# Patient Record
Sex: MALE | Race: WHITE | NOT HISPANIC OR LATINO
[De-identification: names, ages, dates, MRNs, and addresses within clinical notes are randomized per-mention and may not be internally consistent; named-entity substitution may affect disease eponyms.]

---

## 2023-02-28 PROBLEM — Z00.00 ENCOUNTER FOR PREVENTIVE HEALTH EXAMINATION: Status: ACTIVE | Noted: 2023-02-28

## 2023-03-09 ENCOUNTER — APPOINTMENT (OUTPATIENT)
Dept: CARDIOLOGY | Facility: CLINIC | Age: 73
End: 2023-03-09

## 2023-03-15 ENCOUNTER — APPOINTMENT (OUTPATIENT)
Dept: CARDIOLOGY | Facility: CLINIC | Age: 73
End: 2023-03-15

## 2023-04-23 ENCOUNTER — EMERGENCY (EMERGENCY)
Facility: HOSPITAL | Age: 73
LOS: 0 days | Discharge: HOME | End: 2023-04-24
Attending: EMERGENCY MEDICINE
Payer: MEDICARE

## 2023-04-23 VITALS
SYSTOLIC BLOOD PRESSURE: 128 MMHG | TEMPERATURE: 97 F | OXYGEN SATURATION: 97 % | HEART RATE: 107 BPM | DIASTOLIC BLOOD PRESSURE: 76 MMHG | RESPIRATION RATE: 19 BRPM | WEIGHT: 201.94 LBS

## 2023-04-23 DIAGNOSIS — K76.89 OTHER SPECIFIED DISEASES OF LIVER: ICD-10-CM

## 2023-04-23 DIAGNOSIS — Z87.19 PERSONAL HISTORY OF OTHER DISEASES OF THE DIGESTIVE SYSTEM: ICD-10-CM

## 2023-04-23 DIAGNOSIS — R11.2 NAUSEA WITH VOMITING, UNSPECIFIED: ICD-10-CM

## 2023-04-23 DIAGNOSIS — R10.9 UNSPECIFIED ABDOMINAL PAIN: ICD-10-CM

## 2023-04-23 LAB
ALBUMIN SERPL ELPH-MCNC: 4.9 G/DL — SIGNIFICANT CHANGE UP (ref 3.5–5.2)
ALP SERPL-CCNC: 130 U/L — HIGH (ref 30–115)
ALT FLD-CCNC: 30 U/L — SIGNIFICANT CHANGE UP (ref 0–41)
ANION GAP SERPL CALC-SCNC: 15 MMOL/L — HIGH (ref 7–14)
AST SERPL-CCNC: 25 U/L — SIGNIFICANT CHANGE UP (ref 0–41)
BASOPHILS # BLD AUTO: 0.1 K/UL — SIGNIFICANT CHANGE UP (ref 0–0.2)
BASOPHILS NFR BLD AUTO: 0.5 % — SIGNIFICANT CHANGE UP (ref 0–1)
BILIRUB SERPL-MCNC: 0.7 MG/DL — SIGNIFICANT CHANGE UP (ref 0.2–1.2)
BUN SERPL-MCNC: 19 MG/DL — SIGNIFICANT CHANGE UP (ref 10–20)
CALCIUM SERPL-MCNC: 10.6 MG/DL — HIGH (ref 8.4–10.5)
CHLORIDE SERPL-SCNC: 102 MMOL/L — SIGNIFICANT CHANGE UP (ref 98–110)
CO2 SERPL-SCNC: 23 MMOL/L — SIGNIFICANT CHANGE UP (ref 17–32)
CREAT SERPL-MCNC: 1.4 MG/DL — SIGNIFICANT CHANGE UP (ref 0.7–1.5)
EGFR: 54 ML/MIN/1.73M2 — LOW
EOSINOPHIL # BLD AUTO: 0 K/UL — SIGNIFICANT CHANGE UP (ref 0–0.7)
EOSINOPHIL NFR BLD AUTO: 0 % — SIGNIFICANT CHANGE UP (ref 0–8)
GLUCOSE SERPL-MCNC: 112 MG/DL — HIGH (ref 70–99)
HCT VFR BLD CALC: 48.3 % — SIGNIFICANT CHANGE UP (ref 42–52)
HGB BLD-MCNC: 17 G/DL — SIGNIFICANT CHANGE UP (ref 14–18)
IMM GRANULOCYTES NFR BLD AUTO: 0.4 % — HIGH (ref 0.1–0.3)
LACTATE SERPL-SCNC: 2.8 MMOL/L — HIGH (ref 0.7–2)
LIDOCAIN IGE QN: 21 U/L — SIGNIFICANT CHANGE UP (ref 7–60)
LYMPHOCYTES # BLD AUTO: 1.53 K/UL — SIGNIFICANT CHANGE UP (ref 1.2–3.4)
LYMPHOCYTES # BLD AUTO: 7.2 % — LOW (ref 20.5–51.1)
MAGNESIUM SERPL-MCNC: 2.2 MG/DL — SIGNIFICANT CHANGE UP (ref 1.8–2.4)
MCHC RBC-ENTMCNC: 29.4 PG — SIGNIFICANT CHANGE UP (ref 27–31)
MCHC RBC-ENTMCNC: 35.2 G/DL — SIGNIFICANT CHANGE UP (ref 32–37)
MCV RBC AUTO: 83.6 FL — SIGNIFICANT CHANGE UP (ref 80–94)
MONOCYTES # BLD AUTO: 1.09 K/UL — HIGH (ref 0.1–0.6)
MONOCYTES NFR BLD AUTO: 5.1 % — SIGNIFICANT CHANGE UP (ref 1.7–9.3)
NEUTROPHILS # BLD AUTO: 18.44 K/UL — HIGH (ref 1.4–6.5)
NEUTROPHILS NFR BLD AUTO: 86.8 % — HIGH (ref 42.2–75.2)
NRBC # BLD: 0 /100 WBCS — SIGNIFICANT CHANGE UP (ref 0–0)
PLATELET # BLD AUTO: 254 K/UL — SIGNIFICANT CHANGE UP (ref 130–400)
PMV BLD: 11.4 FL — HIGH (ref 7.4–10.4)
POTASSIUM SERPL-MCNC: 4.3 MMOL/L — SIGNIFICANT CHANGE UP (ref 3.5–5)
POTASSIUM SERPL-SCNC: 4.3 MMOL/L — SIGNIFICANT CHANGE UP (ref 3.5–5)
PROT SERPL-MCNC: 7.7 G/DL — SIGNIFICANT CHANGE UP (ref 6–8)
RBC # BLD: 5.78 M/UL — SIGNIFICANT CHANGE UP (ref 4.7–6.1)
RBC # FLD: 12.9 % — SIGNIFICANT CHANGE UP (ref 11.5–14.5)
SODIUM SERPL-SCNC: 140 MMOL/L — SIGNIFICANT CHANGE UP (ref 135–146)
TROPONIN T SERPL-MCNC: <0.01 NG/ML — SIGNIFICANT CHANGE UP
WBC # BLD: 21.24 K/UL — HIGH (ref 4.8–10.8)
WBC # FLD AUTO: 21.24 K/UL — HIGH (ref 4.8–10.8)

## 2023-04-23 PROCEDURE — 85025 COMPLETE CBC W/AUTO DIFF WBC: CPT

## 2023-04-23 PROCEDURE — 84484 ASSAY OF TROPONIN QUANT: CPT

## 2023-04-23 PROCEDURE — 83735 ASSAY OF MAGNESIUM: CPT

## 2023-04-23 PROCEDURE — 83605 ASSAY OF LACTIC ACID: CPT

## 2023-04-23 PROCEDURE — 36415 COLL VENOUS BLD VENIPUNCTURE: CPT

## 2023-04-23 PROCEDURE — 99285 EMERGENCY DEPT VISIT HI MDM: CPT | Mod: FS

## 2023-04-23 PROCEDURE — 74177 CT ABD & PELVIS W/CONTRAST: CPT | Mod: MA

## 2023-04-23 PROCEDURE — 96375 TX/PRO/DX INJ NEW DRUG ADDON: CPT

## 2023-04-23 PROCEDURE — 93005 ELECTROCARDIOGRAM TRACING: CPT

## 2023-04-23 PROCEDURE — 96374 THER/PROPH/DIAG INJ IV PUSH: CPT | Mod: XU

## 2023-04-23 PROCEDURE — 93010 ELECTROCARDIOGRAM REPORT: CPT

## 2023-04-23 PROCEDURE — 99285 EMERGENCY DEPT VISIT HI MDM: CPT | Mod: 25

## 2023-04-23 PROCEDURE — 80053 COMPREHEN METABOLIC PANEL: CPT

## 2023-04-23 PROCEDURE — 83690 ASSAY OF LIPASE: CPT

## 2023-04-23 PROCEDURE — 74177 CT ABD & PELVIS W/CONTRAST: CPT | Mod: 26,MA

## 2023-04-23 RX ORDER — MORPHINE SULFATE 50 MG/1
4 CAPSULE, EXTENDED RELEASE ORAL ONCE
Refills: 0 | Status: DISCONTINUED | OUTPATIENT
Start: 2023-04-23 | End: 2023-04-23

## 2023-04-23 RX ORDER — ONDANSETRON 8 MG/1
4 TABLET, FILM COATED ORAL ONCE
Refills: 0 | Status: COMPLETED | OUTPATIENT
Start: 2023-04-23 | End: 2023-04-23

## 2023-04-23 RX ORDER — SODIUM CHLORIDE 9 MG/ML
1000 INJECTION, SOLUTION INTRAVENOUS ONCE
Refills: 0 | Status: COMPLETED | OUTPATIENT
Start: 2023-04-23 | End: 2023-04-23

## 2023-04-23 RX ADMIN — MORPHINE SULFATE 4 MILLIGRAM(S): 50 CAPSULE, EXTENDED RELEASE ORAL at 19:05

## 2023-04-23 RX ADMIN — ONDANSETRON 4 MILLIGRAM(S): 8 TABLET, FILM COATED ORAL at 19:06

## 2023-04-23 RX ADMIN — SODIUM CHLORIDE 1000 MILLILITER(S): 9 INJECTION, SOLUTION INTRAVENOUS at 19:09

## 2023-04-23 NOTE — ED ADULT NURSE NOTE - NS ED NURSE RECORD ANOTHER VITAL SIGN
Yes Post-Care Instructions: I reviewed with the patient in detail post-care instructions. Patient is to keep the biopsy site clean with soapy water, and then apply vaseline once daily until healed. Patient may apply hydrogen peroxide soaks to remove any crusting.

## 2023-04-24 VITALS
RESPIRATION RATE: 18 BRPM | HEART RATE: 99 BPM | DIASTOLIC BLOOD PRESSURE: 76 MMHG | OXYGEN SATURATION: 98 % | SYSTOLIC BLOOD PRESSURE: 120 MMHG

## 2023-04-24 NOTE — ED PROVIDER NOTE - CLINICAL SUMMARY MEDICAL DECISION MAKING FREE TEXT BOX
73-year-old male, history of duodenitis, hiatal hernia, here with abdominal pain and vomiting.  Last BM today.  Passing gas.  Labs noted for WBC 21, BUN 19, creatinine 1.4, lipase 21, troponin less than 0.01.  EKG no acute changes.  CT abdomen most consistent with ileus.  Given IV fluids, morphine and Zofran with complete relief.  Patient wants to go home.  Will DC to follow-up with PCP.  Instructed to return if symptoms worsen or recur.

## 2023-04-24 NOTE — ED PROVIDER NOTE - ATTENDING APP SHARED VISIT CONTRIBUTION OF CARE
73-year-old male, history of duodenitis, hiatal hernia, presents with abdominal pain and vomiting since this afternoon.  Last BM today.  Passing gas.  Exam shows alert patient in no distress, HEENT NCAT PERRL, lungs clear, RR S1S2, abdomen soft +periumbilical tenderness +BS, no CCE.

## 2023-04-24 NOTE — ED PROVIDER NOTE - PATIENT PORTAL LINK FT
You can access the FollowMyHealth Patient Portal offered by [facility] by registering at the following website: [email or web address]. By joining [employer]’s FollowMyHealth portal, you will also be able to view your health information using other applications (apps) compatible with our system.

## 2023-04-24 NOTE — ED PROVIDER NOTE - OBJECTIVE STATEMENT
patient c/o N/V since this afternoon, Woke this am ate drank coffee felt fine, Normal BM today, no blood, not black and tarry,   vomiting bile past several hours with abd pain, no chest pain, no SOB,

## 2023-05-04 PROBLEM — K29.80 DUODENITIS WITHOUT BLEEDING: Chronic | Status: ACTIVE | Noted: 2023-04-24

## 2023-05-04 PROBLEM — K44.9 DIAPHRAGMATIC HERNIA WITHOUT OBSTRUCTION OR GANGRENE: Chronic | Status: ACTIVE | Noted: 2023-04-24

## 2023-05-11 ENCOUNTER — OUTPATIENT (OUTPATIENT)
Dept: OUTPATIENT SERVICES | Facility: HOSPITAL | Age: 73
LOS: 1 days | End: 2023-05-11
Payer: MEDICARE

## 2023-05-11 DIAGNOSIS — R10.9 UNSPECIFIED ABDOMINAL PAIN: ICD-10-CM

## 2023-05-11 DIAGNOSIS — Z00.8 ENCOUNTER FOR OTHER GENERAL EXAMINATION: ICD-10-CM

## 2023-05-11 PROCEDURE — A9579: CPT

## 2023-05-11 PROCEDURE — 74183 MRI ABD W/O CNTR FLWD CNTR: CPT

## 2023-05-11 PROCEDURE — 74183 MRI ABD W/O CNTR FLWD CNTR: CPT | Mod: 26,MH

## 2023-05-12 DIAGNOSIS — R10.9 UNSPECIFIED ABDOMINAL PAIN: ICD-10-CM

## 2023-06-01 ENCOUNTER — APPOINTMENT (OUTPATIENT)
Dept: CARDIOLOGY | Facility: CLINIC | Age: 73
End: 2023-06-01
Payer: MEDICARE

## 2023-06-01 VITALS — DIASTOLIC BLOOD PRESSURE: 72 MMHG | SYSTOLIC BLOOD PRESSURE: 124 MMHG

## 2023-06-01 VITALS — HEIGHT: 70 IN | BODY MASS INDEX: 28.35 KG/M2 | TEMPERATURE: 97.3 F | WEIGHT: 198 LBS

## 2023-06-01 VITALS — HEART RATE: 89 BPM | SYSTOLIC BLOOD PRESSURE: 136 MMHG | DIASTOLIC BLOOD PRESSURE: 86 MMHG

## 2023-06-01 DIAGNOSIS — Z83.3 FAMILY HISTORY OF DIABETES MELLITUS: ICD-10-CM

## 2023-06-01 DIAGNOSIS — Z78.9 OTHER SPECIFIED HEALTH STATUS: ICD-10-CM

## 2023-06-01 DIAGNOSIS — R53.83 OTHER FATIGUE: ICD-10-CM

## 2023-06-01 DIAGNOSIS — M54.16 RADICULOPATHY, LUMBAR REGION: ICD-10-CM

## 2023-06-01 DIAGNOSIS — Z82.49 FAMILY HISTORY OF ISCHEMIC HEART DISEASE AND OTHER DISEASES OF THE CIRCULATORY SYSTEM: ICD-10-CM

## 2023-06-01 DIAGNOSIS — E78.5 HYPERLIPIDEMIA, UNSPECIFIED: ICD-10-CM

## 2023-06-01 DIAGNOSIS — Z86.59 PERSONAL HISTORY OF OTHER MENTAL AND BEHAVIORAL DISORDERS: ICD-10-CM

## 2023-06-01 DIAGNOSIS — R06.02 SHORTNESS OF BREATH: ICD-10-CM

## 2023-06-01 DIAGNOSIS — I10 ESSENTIAL (PRIMARY) HYPERTENSION: ICD-10-CM

## 2023-06-01 PROCEDURE — 99204 OFFICE O/P NEW MOD 45 MIN: CPT

## 2023-06-01 PROCEDURE — 93000 ELECTROCARDIOGRAM COMPLETE: CPT

## 2023-06-01 RX ORDER — ROSUVASTATIN CALCIUM 10 MG/1
10 TABLET, FILM COATED ORAL
Refills: 0 | Status: ACTIVE | COMMUNITY

## 2023-06-01 RX ORDER — BUPROPION HYDROCHLORIDE 75 MG/1
TABLET, FILM COATED ORAL
Refills: 0 | Status: ACTIVE | COMMUNITY

## 2023-06-01 RX ORDER — ESCITALOPRAM OXALATE 10 MG/1
10 TABLET ORAL
Refills: 0 | Status: ACTIVE | COMMUNITY

## 2023-06-01 NOTE — ASSESSMENT
[FreeTextEntry1] : COLÓN/SOB: Pt with HLD, cannot rule out ischemic cause of symptoms. \par -Check exercise stress echo. \par -Discussed with patient if worsening symptoms or new CP, to go to ED for evaluation. \par \par HLD: At goal\par -Continue with rosuvastatin 10mg PO daily. \par -Discussed therapeutic lifestyle changes to promote improved lipid metabolism. \par \par Elevated AP:\par -Being monitored by PMD. \par \par Follow up in 3 months. \par

## 2023-06-01 NOTE — REASON FOR VISIT
[FreeTextEntry1] : Diagnostic Tests:\par ---------------------\par EKG: \par 06/01/23-NSR with PACs. [facility].

## 2023-06-01 NOTE — HISTORY OF PRESENT ILLNESS
[FreeTextEntry1] : Mr. Oropeza is a 74yo M with PMHx of HTN, HLD who presents to \[facility]\"" care. His PMD is Dr. Josephine Adams. He has been having increased fatigue with some SOB. He complains of COLÓN with stairs. These symptoms started after having COVID last year. He denies associated CP, palpitations, lightheadedness. His wife recently passed away after possible MI.

## 2023-08-23 ENCOUNTER — APPOINTMENT (OUTPATIENT)
Dept: CARDIOLOGY | Facility: CLINIC | Age: 73
End: 2023-08-23

## 2023-10-04 ENCOUNTER — APPOINTMENT (OUTPATIENT)
Dept: ORTHOPEDIC SURGERY | Facility: CLINIC | Age: 73
End: 2023-10-04
Payer: MEDICARE

## 2023-10-04 VITALS — WEIGHT: 198 LBS | BODY MASS INDEX: 28.35 KG/M2 | HEIGHT: 70 IN

## 2023-10-04 DIAGNOSIS — M76.62 ACHILLES TENDINITIS, LEFT LEG: ICD-10-CM

## 2023-10-04 PROCEDURE — 99203 OFFICE O/P NEW LOW 30 MIN: CPT

## 2023-10-04 PROCEDURE — 73590 X-RAY EXAM OF LOWER LEG: CPT | Mod: LT

## 2023-10-04 PROCEDURE — 73610 X-RAY EXAM OF ANKLE: CPT | Mod: LT

## 2023-10-05 RX ORDER — MELOXICAM 15 MG/1
15 TABLET ORAL
Qty: 30 | Refills: 0 | Status: ACTIVE | COMMUNITY
Start: 2023-10-05 | End: 1900-01-01

## 2023-10-19 ENCOUNTER — APPOINTMENT (OUTPATIENT)
Dept: CARDIOLOGY | Facility: CLINIC | Age: 73
End: 2023-10-19

## 2023-11-01 ENCOUNTER — APPOINTMENT (OUTPATIENT)
Dept: CARDIOLOGY | Facility: CLINIC | Age: 73
End: 2023-11-01

## 2024-01-17 ENCOUNTER — EMERGENCY (EMERGENCY)
Facility: HOSPITAL | Age: 74
LOS: 0 days | Discharge: HOME | End: 2024-01-17
Attending: EMERGENCY MEDICINE
Payer: MEDICARE

## 2024-01-17 ENCOUNTER — INPATIENT (INPATIENT)
Facility: HOSPITAL | Age: 74
LOS: 2 days | Discharge: HOME | DRG: 418 | End: 2024-01-20
Attending: SURGERY | Admitting: SURGERY
Payer: MEDICARE

## 2024-01-17 VITALS
TEMPERATURE: 98 F | DIASTOLIC BLOOD PRESSURE: 81 MMHG | WEIGHT: 199.96 LBS | RESPIRATION RATE: 18 BRPM | HEIGHT: 69 IN | SYSTOLIC BLOOD PRESSURE: 157 MMHG | HEART RATE: 88 BPM | OXYGEN SATURATION: 99 %

## 2024-01-17 VITALS
HEART RATE: 74 BPM | SYSTOLIC BLOOD PRESSURE: 151 MMHG | RESPIRATION RATE: 18 BRPM | DIASTOLIC BLOOD PRESSURE: 83 MMHG | OXYGEN SATURATION: 97 %

## 2024-01-17 VITALS
HEART RATE: 94 BPM | WEIGHT: 199.96 LBS | TEMPERATURE: 97 F | SYSTOLIC BLOOD PRESSURE: 155 MMHG | HEIGHT: 69 IN | DIASTOLIC BLOOD PRESSURE: 86 MMHG | OXYGEN SATURATION: 99 % | RESPIRATION RATE: 18 BRPM

## 2024-01-17 DIAGNOSIS — E78.5 HYPERLIPIDEMIA, UNSPECIFIED: ICD-10-CM

## 2024-01-17 DIAGNOSIS — K44.9 DIAPHRAGMATIC HERNIA WITHOUT OBSTRUCTION OR GANGRENE: ICD-10-CM

## 2024-01-17 DIAGNOSIS — R10.13 EPIGASTRIC PAIN: ICD-10-CM

## 2024-01-17 DIAGNOSIS — R11.0 NAUSEA: ICD-10-CM

## 2024-01-17 LAB
ALBUMIN SERPL ELPH-MCNC: 4.2 G/DL — SIGNIFICANT CHANGE UP (ref 3.5–5.2)
ALBUMIN SERPL ELPH-MCNC: 4.5 G/DL — SIGNIFICANT CHANGE UP (ref 3.5–5.2)
ALP SERPL-CCNC: 119 U/L — HIGH (ref 30–115)
ALP SERPL-CCNC: 124 U/L — HIGH (ref 30–115)
ALT FLD-CCNC: 20 U/L — SIGNIFICANT CHANGE UP (ref 0–41)
ALT FLD-CCNC: 22 U/L — SIGNIFICANT CHANGE UP (ref 0–41)
ANION GAP SERPL CALC-SCNC: 10 MMOL/L — SIGNIFICANT CHANGE UP (ref 7–14)
ANION GAP SERPL CALC-SCNC: 13 MMOL/L — SIGNIFICANT CHANGE UP (ref 7–14)
APPEARANCE UR: CLEAR — SIGNIFICANT CHANGE UP
AST SERPL-CCNC: 17 U/L — SIGNIFICANT CHANGE UP (ref 0–41)
AST SERPL-CCNC: 19 U/L — SIGNIFICANT CHANGE UP (ref 0–41)
BASOPHILS # BLD AUTO: 0.05 K/UL — SIGNIFICANT CHANGE UP (ref 0–0.2)
BASOPHILS # BLD AUTO: 0.08 K/UL — SIGNIFICANT CHANGE UP (ref 0–0.2)
BASOPHILS NFR BLD AUTO: 0.3 % — SIGNIFICANT CHANGE UP (ref 0–1)
BASOPHILS NFR BLD AUTO: 0.5 % — SIGNIFICANT CHANGE UP (ref 0–1)
BILIRUB SERPL-MCNC: 0.3 MG/DL — SIGNIFICANT CHANGE UP (ref 0.2–1.2)
BILIRUB SERPL-MCNC: 0.7 MG/DL — SIGNIFICANT CHANGE UP (ref 0.2–1.2)
BILIRUB UR-MCNC: NEGATIVE — SIGNIFICANT CHANGE UP
BUN SERPL-MCNC: 14 MG/DL — SIGNIFICANT CHANGE UP (ref 10–20)
BUN SERPL-MCNC: 16 MG/DL — SIGNIFICANT CHANGE UP (ref 10–20)
CALCIUM SERPL-MCNC: 9.4 MG/DL — SIGNIFICANT CHANGE UP (ref 8.4–10.5)
CALCIUM SERPL-MCNC: 9.7 MG/DL — SIGNIFICANT CHANGE UP (ref 8.4–10.5)
CHLORIDE SERPL-SCNC: 100 MMOL/L — SIGNIFICANT CHANGE UP (ref 98–110)
CHLORIDE SERPL-SCNC: 103 MMOL/L — SIGNIFICANT CHANGE UP (ref 98–110)
CO2 SERPL-SCNC: 24 MMOL/L — SIGNIFICANT CHANGE UP (ref 17–32)
CO2 SERPL-SCNC: 27 MMOL/L — SIGNIFICANT CHANGE UP (ref 17–32)
COLOR SPEC: YELLOW — SIGNIFICANT CHANGE UP
CREAT SERPL-MCNC: 1.1 MG/DL — SIGNIFICANT CHANGE UP (ref 0.7–1.5)
CREAT SERPL-MCNC: 1.4 MG/DL — SIGNIFICANT CHANGE UP (ref 0.7–1.5)
DIFF PNL FLD: NEGATIVE — SIGNIFICANT CHANGE UP
EGFR: 54 ML/MIN/1.73M2 — LOW
EGFR: 72 ML/MIN/1.73M2 — SIGNIFICANT CHANGE UP
EOSINOPHIL # BLD AUTO: 0 K/UL — SIGNIFICANT CHANGE UP (ref 0–0.7)
EOSINOPHIL # BLD AUTO: 0 K/UL — SIGNIFICANT CHANGE UP (ref 0–0.7)
EOSINOPHIL NFR BLD AUTO: 0 % — SIGNIFICANT CHANGE UP (ref 0–8)
EOSINOPHIL NFR BLD AUTO: 0 % — SIGNIFICANT CHANGE UP (ref 0–8)
GLUCOSE SERPL-MCNC: 113 MG/DL — HIGH (ref 70–99)
GLUCOSE SERPL-MCNC: 149 MG/DL — HIGH (ref 70–99)
GLUCOSE UR QL: NEGATIVE MG/DL — SIGNIFICANT CHANGE UP
HCT VFR BLD CALC: 41.9 % — LOW (ref 42–52)
HCT VFR BLD CALC: 42.2 % — SIGNIFICANT CHANGE UP (ref 42–52)
HGB BLD-MCNC: 14.6 G/DL — SIGNIFICANT CHANGE UP (ref 14–18)
HGB BLD-MCNC: 14.6 G/DL — SIGNIFICANT CHANGE UP (ref 14–18)
IMM GRANULOCYTES NFR BLD AUTO: 0.3 % — SIGNIFICANT CHANGE UP (ref 0.1–0.3)
IMM GRANULOCYTES NFR BLD AUTO: 0.4 % — HIGH (ref 0.1–0.3)
KETONES UR-MCNC: ABNORMAL MG/DL
LACTATE SERPL-SCNC: 2.8 MMOL/L — HIGH (ref 0.7–2)
LEUKOCYTE ESTERASE UR-ACNC: NEGATIVE — SIGNIFICANT CHANGE UP
LIDOCAIN IGE QN: 17 U/L — SIGNIFICANT CHANGE UP (ref 7–60)
LIDOCAIN IGE QN: 22 U/L — SIGNIFICANT CHANGE UP (ref 7–60)
LYMPHOCYTES # BLD AUTO: 0.89 K/UL — LOW (ref 1.2–3.4)
LYMPHOCYTES # BLD AUTO: 1.33 K/UL — SIGNIFICANT CHANGE UP (ref 1.2–3.4)
LYMPHOCYTES # BLD AUTO: 4.8 % — LOW (ref 20.5–51.1)
LYMPHOCYTES # BLD AUTO: 8.4 % — LOW (ref 20.5–51.1)
MCHC RBC-ENTMCNC: 29 PG — SIGNIFICANT CHANGE UP (ref 27–31)
MCHC RBC-ENTMCNC: 29 PG — SIGNIFICANT CHANGE UP (ref 27–31)
MCHC RBC-ENTMCNC: 34.6 G/DL — SIGNIFICANT CHANGE UP (ref 32–37)
MCHC RBC-ENTMCNC: 34.8 G/DL — SIGNIFICANT CHANGE UP (ref 32–37)
MCV RBC AUTO: 83.3 FL — SIGNIFICANT CHANGE UP (ref 80–94)
MCV RBC AUTO: 83.9 FL — SIGNIFICANT CHANGE UP (ref 80–94)
MONOCYTES # BLD AUTO: 0.56 K/UL — SIGNIFICANT CHANGE UP (ref 0.1–0.6)
MONOCYTES # BLD AUTO: 1.13 K/UL — HIGH (ref 0.1–0.6)
MONOCYTES NFR BLD AUTO: 3.5 % — SIGNIFICANT CHANGE UP (ref 1.7–9.3)
MONOCYTES NFR BLD AUTO: 6.2 % — SIGNIFICANT CHANGE UP (ref 1.7–9.3)
NEUTROPHILS # BLD AUTO: 13.74 K/UL — HIGH (ref 1.4–6.5)
NEUTROPHILS # BLD AUTO: 16.24 K/UL — HIGH (ref 1.4–6.5)
NEUTROPHILS NFR BLD AUTO: 87.2 % — HIGH (ref 42.2–75.2)
NEUTROPHILS NFR BLD AUTO: 88.4 % — HIGH (ref 42.2–75.2)
NITRITE UR-MCNC: NEGATIVE — SIGNIFICANT CHANGE UP
NRBC # BLD: 0 /100 WBCS — SIGNIFICANT CHANGE UP (ref 0–0)
NRBC # BLD: 0 /100 WBCS — SIGNIFICANT CHANGE UP (ref 0–0)
PH UR: 7.5 — SIGNIFICANT CHANGE UP (ref 5–8)
PLATELET # BLD AUTO: 248 K/UL — SIGNIFICANT CHANGE UP (ref 130–400)
PLATELET # BLD AUTO: 277 K/UL — SIGNIFICANT CHANGE UP (ref 130–400)
PMV BLD: 10.7 FL — HIGH (ref 7.4–10.4)
PMV BLD: 10.8 FL — HIGH (ref 7.4–10.4)
POTASSIUM SERPL-MCNC: 4.1 MMOL/L — SIGNIFICANT CHANGE UP (ref 3.5–5)
POTASSIUM SERPL-MCNC: 4.6 MMOL/L — SIGNIFICANT CHANGE UP (ref 3.5–5)
POTASSIUM SERPL-SCNC: 4.1 MMOL/L — SIGNIFICANT CHANGE UP (ref 3.5–5)
POTASSIUM SERPL-SCNC: 4.6 MMOL/L — SIGNIFICANT CHANGE UP (ref 3.5–5)
PROT SERPL-MCNC: 6.8 G/DL — SIGNIFICANT CHANGE UP (ref 6–8)
PROT SERPL-MCNC: 6.8 G/DL — SIGNIFICANT CHANGE UP (ref 6–8)
PROT UR-MCNC: NEGATIVE MG/DL — SIGNIFICANT CHANGE UP
RBC # BLD: 5.03 M/UL — SIGNIFICANT CHANGE UP (ref 4.7–6.1)
RBC # BLD: 5.03 M/UL — SIGNIFICANT CHANGE UP (ref 4.7–6.1)
RBC # FLD: 12.7 % — SIGNIFICANT CHANGE UP (ref 11.5–14.5)
RBC # FLD: 12.9 % — SIGNIFICANT CHANGE UP (ref 11.5–14.5)
SODIUM SERPL-SCNC: 137 MMOL/L — SIGNIFICANT CHANGE UP (ref 135–146)
SODIUM SERPL-SCNC: 140 MMOL/L — SIGNIFICANT CHANGE UP (ref 135–146)
SP GR SPEC: >1.03 — HIGH (ref 1–1.03)
UROBILINOGEN FLD QL: 0.2 MG/DL — SIGNIFICANT CHANGE UP (ref 0.2–1)
WBC # BLD: 15.78 K/UL — HIGH (ref 4.8–10.8)
WBC # BLD: 18.36 K/UL — HIGH (ref 4.8–10.8)
WBC # FLD AUTO: 15.78 K/UL — HIGH (ref 4.8–10.8)
WBC # FLD AUTO: 18.36 K/UL — HIGH (ref 4.8–10.8)

## 2024-01-17 PROCEDURE — 74174 CTA ABD&PLVS W/CONTRAST: CPT | Mod: 26,MA

## 2024-01-17 PROCEDURE — 74177 CT ABD & PELVIS W/CONTRAST: CPT | Mod: MA

## 2024-01-17 PROCEDURE — 99285 EMERGENCY DEPT VISIT HI MDM: CPT

## 2024-01-17 PROCEDURE — 74177 CT ABD & PELVIS W/CONTRAST: CPT | Mod: 26,59,MA

## 2024-01-17 PROCEDURE — 96375 TX/PRO/DX INJ NEW DRUG ADDON: CPT

## 2024-01-17 PROCEDURE — 93010 ELECTROCARDIOGRAM REPORT: CPT | Mod: 77

## 2024-01-17 PROCEDURE — 93010 ELECTROCARDIOGRAM REPORT: CPT

## 2024-01-17 PROCEDURE — 93010 ELECTROCARDIOGRAM REPORT: CPT | Mod: 76

## 2024-01-17 PROCEDURE — 96374 THER/PROPH/DIAG INJ IV PUSH: CPT | Mod: XU

## 2024-01-17 PROCEDURE — 85025 COMPLETE CBC W/AUTO DIFF WBC: CPT

## 2024-01-17 PROCEDURE — 36415 COLL VENOUS BLD VENIPUNCTURE: CPT

## 2024-01-17 PROCEDURE — 93005 ELECTROCARDIOGRAM TRACING: CPT

## 2024-01-17 PROCEDURE — 83690 ASSAY OF LIPASE: CPT

## 2024-01-17 PROCEDURE — 87086 URINE CULTURE/COLONY COUNT: CPT

## 2024-01-17 PROCEDURE — 96376 TX/PRO/DX INJ SAME DRUG ADON: CPT

## 2024-01-17 PROCEDURE — 99285 EMERGENCY DEPT VISIT HI MDM: CPT | Mod: 25

## 2024-01-17 PROCEDURE — 81003 URINALYSIS AUTO W/O SCOPE: CPT

## 2024-01-17 PROCEDURE — 80053 COMPREHEN METABOLIC PANEL: CPT

## 2024-01-17 PROCEDURE — 76705 ECHO EXAM OF ABDOMEN: CPT | Mod: 26

## 2024-01-17 PROCEDURE — 99285 EMERGENCY DEPT VISIT HI MDM: CPT | Mod: FS

## 2024-01-17 RX ORDER — FAMOTIDINE 10 MG/ML
20 INJECTION INTRAVENOUS ONCE
Refills: 0 | Status: COMPLETED | OUTPATIENT
Start: 2024-01-17 | End: 2024-01-17

## 2024-01-17 RX ORDER — SUCRALFATE 1 G
1 TABLET ORAL
Qty: 40 | Refills: 0
Start: 2024-01-17 | End: 2024-01-26

## 2024-01-17 RX ORDER — CEFEPIME 1 G/1
2000 INJECTION, POWDER, FOR SOLUTION INTRAMUSCULAR; INTRAVENOUS ONCE
Refills: 0 | Status: COMPLETED | OUTPATIENT
Start: 2024-01-17 | End: 2024-01-17

## 2024-01-17 RX ORDER — ONDANSETRON 8 MG/1
4 TABLET, FILM COATED ORAL ONCE
Refills: 0 | Status: COMPLETED | OUTPATIENT
Start: 2024-01-17 | End: 2024-01-17

## 2024-01-17 RX ORDER — METRONIDAZOLE 500 MG
500 TABLET ORAL ONCE
Refills: 0 | Status: COMPLETED | OUTPATIENT
Start: 2024-01-17 | End: 2024-01-17

## 2024-01-17 RX ORDER — SUCRALFATE 1 G
1 TABLET ORAL ONCE
Refills: 0 | Status: COMPLETED | OUTPATIENT
Start: 2024-01-17 | End: 2024-01-17

## 2024-01-17 RX ORDER — MORPHINE SULFATE 50 MG/1
4 CAPSULE, EXTENDED RELEASE ORAL ONCE
Refills: 0 | Status: DISCONTINUED | OUTPATIENT
Start: 2024-01-17 | End: 2024-01-17

## 2024-01-17 RX ORDER — HYDROMORPHONE HYDROCHLORIDE 2 MG/ML
0.5 INJECTION INTRAMUSCULAR; INTRAVENOUS; SUBCUTANEOUS ONCE
Refills: 0 | Status: DISCONTINUED | OUTPATIENT
Start: 2024-01-17 | End: 2024-01-17

## 2024-01-17 RX ORDER — SODIUM CHLORIDE 9 MG/ML
1000 INJECTION INTRAMUSCULAR; INTRAVENOUS; SUBCUTANEOUS ONCE
Refills: 0 | Status: COMPLETED | OUTPATIENT
Start: 2024-01-17 | End: 2024-01-17

## 2024-01-17 RX ADMIN — MORPHINE SULFATE 4 MILLIGRAM(S): 50 CAPSULE, EXTENDED RELEASE ORAL at 00:58

## 2024-01-17 RX ADMIN — HYDROMORPHONE HYDROCHLORIDE 0.5 MILLIGRAM(S): 2 INJECTION INTRAMUSCULAR; INTRAVENOUS; SUBCUTANEOUS at 02:58

## 2024-01-17 RX ADMIN — MORPHINE SULFATE 4 MILLIGRAM(S): 50 CAPSULE, EXTENDED RELEASE ORAL at 01:36

## 2024-01-17 RX ADMIN — FAMOTIDINE 20 MILLIGRAM(S): 10 INJECTION INTRAVENOUS at 00:59

## 2024-01-17 RX ADMIN — CEFEPIME 100 MILLIGRAM(S): 1 INJECTION, POWDER, FOR SOLUTION INTRAMUSCULAR; INTRAVENOUS at 23:31

## 2024-01-17 RX ADMIN — Medication 1 GRAM(S): at 02:57

## 2024-01-17 RX ADMIN — ONDANSETRON 4 MILLIGRAM(S): 8 TABLET, FILM COATED ORAL at 00:58

## 2024-01-17 RX ADMIN — Medication 100 MILLIGRAM(S): at 23:39

## 2024-01-17 RX ADMIN — SODIUM CHLORIDE 2000 MILLILITER(S): 9 INJECTION INTRAMUSCULAR; INTRAVENOUS; SUBCUTANEOUS at 01:00

## 2024-01-17 RX ADMIN — Medication 30 MILLILITER(S): at 01:00

## 2024-01-17 RX ADMIN — MORPHINE SULFATE 4 MILLIGRAM(S): 50 CAPSULE, EXTENDED RELEASE ORAL at 19:31

## 2024-01-17 RX ADMIN — FAMOTIDINE 20 MILLIGRAM(S): 10 INJECTION INTRAVENOUS at 18:22

## 2024-01-17 NOTE — ED PROVIDER NOTE - CONSIDERATION OF ADMISSION OBSERVATION
Pain improved, labs and imaging without acute abnormalities to warrant admission at this time Consideration of Admission/Observation

## 2024-01-17 NOTE — ED PROVIDER NOTE - PATIENT PORTAL LINK FT
You can access the FollowMyHealth Patient Portal offered by [facility] by registering at the following website: [email or web address]. By joining [employer]’s FollowMyHealth portal, you will also be able to view your health information using other applications (apps) compatible with our system.

## 2024-01-17 NOTE — ED ADULT NURSE NOTE - NSFALLUNIVINTERV_ED_ALL_ED
Bed/Stretcher in lowest position, wheels locked, appropriate side rails in place/Call bell, personal items and telephone in reach/Instruct patient to call for assistance before getting out of bed/chair/stretcher/Non-slip footwear applied when patient is off stretcher/[place] to call system/Physically safe environment - no spills, clutter or unnecessary equipment/Purposeful proactive rounding/Room/bathroom lighting operational, light cord in reach

## 2024-01-17 NOTE — ED PROVIDER NOTE - CLINICAL SUMMARY MEDICAL DECISION MAKING FREE TEXT BOX
73-year-old male seen in ED yesterday for epigastric pain radiating to left upper quadrant after eating a pork chop and diez, discharged after labs and abdominal CT which showed hiatal hernia, returns with a return of pain, no fever, no chest pain, passing flatus and stool, had vomiting yesterday though not now, on exam vitals appreciated, nontoxic-appearing, abdomen with normal active bowel sounds, soft with epigastric and left upper quadrant TTP no G/R, labs and studies appreciated and discussed with surgery, will admit for antibiotics and possible OR tomorrow

## 2024-01-17 NOTE — ED PROVIDER NOTE - INTERNATIONAL TRAVEL
Winlevi Pregnancy And Lactation Text: This medication is considered safe during pregnancy and breastfeeding. Use Enhanced Medication Counseling?: No Tetracycline Pregnancy And Lactation Text: This medication is Pregnancy Category D and not consider safe during pregnancy. It is also excreted in breast milk. High Dose Vitamin A Counseling: Side effects reviewed, pt to contact office should one occur. Spironolactone Pregnancy And Lactation Text: This medication can cause feminization of the male fetus and should be avoided during pregnancy. The active metabolite is also found in breast milk. Detail Level: Detailed Topical Retinoid counseling:  Patient advised to apply a pea-sized amount only at bedtime and wait 30 minutes after washing their face before applying.  If too drying, patient may add a non-comedogenic moisturizer. The patient verbalized understanding of the proper use and possible adverse effects of retinoids.  All of the patient's questions and concerns were addressed. No Birth Control Pills Pregnancy And Lactation Text: This medication should be avoided if pregnant and for the first 30 days post-partum. Isotretinoin Counseling: Patient should get monthly blood tests, not donate blood, not drive at night if vision affected, not share medication, and not undergo elective surgery for 6 months after tx completed. Side effects reviewed, pt to contact office should one occur. Bactrim Pregnancy And Lactation Text: This medication is Pregnancy Category D and is known to cause fetal risk.  It is also excreted in breast milk. Topical Sulfur Applications Pregnancy And Lactation Text: This medication is Pregnancy Category C and has an unknown safety profile during pregnancy. It is unknown if this topical medication is excreted in breast milk. Benzoyl Peroxide Counseling: Patient counseled that medicine may cause skin irritation and bleach clothing.  In the event of skin irritation, the patient was advised to reduce the amount of the drug applied or use it less frequently.   The patient verbalized understanding of the proper use and possible adverse effects of benzoyl peroxide.  All of the patient's questions and concerns were addressed. Topical Clindamycin Pregnancy And Lactation Text: This medication is Pregnancy Category B and is considered safe during pregnancy. It is unknown if it is excreted in breast milk. Azelaic Acid Counseling: Patient counseled that medicine may cause skin irritation and to avoid applying near the eyes.  In the event of skin irritation, the patient was advised to reduce the amount of the drug applied or use it less frequently.   The patient verbalized understanding of the proper use and possible adverse effects of azelaic acid.  All of the patient's questions and concerns were addressed. Erythromycin Counseling:  I discussed with the patient the risks of erythromycin including but not limited to GI upset, allergic reaction, drug rash, diarrhea, increase in liver enzymes, and yeast infections. Azithromycin Pregnancy And Lactation Text: This medication is considered safe during pregnancy and is also secreted in breast milk. Doxycycline Counseling:  Patient counseled regarding possible photosensitivity and increased risk for sunburn.  Patient instructed to avoid sunlight, if possible.  When exposed to sunlight, patients should wear protective clothing, sunglasses, and sunscreen.  The patient was instructed to call the office immediately if the following severe adverse effects occur:  hearing changes, easy bruising/bleeding, severe headache, or vision changes.  The patient verbalized understanding of the proper use and possible adverse effects of doxycycline.  All of the patient's questions and concerns were addressed. Tazorac Pregnancy And Lactation Text: This medication is not safe during pregnancy. It is unknown if this medication is excreted in breast milk. Aklief counseling:  Patient advised to apply a pea-sized amount only at bedtime and wait 30 minutes after washing their face before applying.  If too drying, patient may add a non-comedogenic moisturizer.  The most commonly reported side effects including irritation, redness, scaling, dryness, stinging, burning, itching, and increased risk of sunburn.  The patient verbalized understanding of the proper use and possible adverse effects of retinoids.  All of the patient's questions and concerns were addressed. Topical Retinoid Pregnancy And Lactation Text: This medication is Pregnancy Category C. It is unknown if this medication is excreted in breast milk. Benzoyl Peroxide Pregnancy And Lactation Text: This medication is Pregnancy Category C. It is unknown if benzoyl peroxide is excreted in breast milk. High Dose Vitamin A Pregnancy And Lactation Text: High dose vitamin A therapy is contraindicated during pregnancy and breast feeding. Tetracycline Counseling: Patient counseled regarding possible photosensitivity and increased risk for sunburn.  Patient instructed to avoid sunlight, if possible.  When exposed to sunlight, patients should wear protective clothing, sunglasses, and sunscreen.  The patient was instructed to call the office immediately if the following severe adverse effects occur:  hearing changes, easy bruising/bleeding, severe headache, or vision changes.  The patient verbalized understanding of the proper use and possible adverse effects of tetracycline.  All of the patient's questions and concerns were addressed. Patient understands to avoid pregnancy while on therapy due to potential birth defects. Winlevi Counseling:  I discussed with the patient the risks of topical clascoterone including but not limited to erythema, scaling, itching, and stinging. Patient voiced their understanding. Dapsone Counseling: I discussed with the patient the risks of dapsone including but not limited to hemolytic anemia, agranulocytosis, rashes, methemoglobinemia, kidney failure, peripheral neuropathy, headaches, GI upset, and liver toxicity.  Patients who start dapsone require monitoring including baseline LFTs and weekly CBCs for the first month, then every month thereafter.  The patient verbalized understanding of the proper use and possible adverse effects of dapsone.  All of the patient's questions and concerns were addressed. Isotretinoin Pregnancy And Lactation Text: This medication is Pregnancy Category X and is considered extremely dangerous during pregnancy. It is unknown if it is excreted in breast milk. Spironolactone Counseling: Patient advised regarding risks of diarrhea, abdominal pain, hyperkalemia, birth defects (for female patients), liver toxicity and renal toxicity. The patient may need blood work to monitor liver and kidney function and potassium levels while on therapy. The patient verbalized understanding of the proper use and possible adverse effects of spironolactone.  All of the patient's questions and concerns were addressed. Birth Control Pills Counseling: Birth Control Pill Counseling: I discussed with the patient the potential side effects of OCPs including but not limited to increased risk of stroke, heart attack, thrombophlebitis, deep venous thrombosis, hepatic adenomas, breast changes, GI upset, headaches, and depression.  The patient verbalized understanding of the proper use and possible adverse effects of OCPs. All of the patient's questions and concerns were addressed. Topical Sulfur Applications Counseling: Topical Sulfur Counseling: Patient counseled that this medication may cause skin irritation or allergic reactions.  In the event of skin irritation, the patient was advised to reduce the amount of the drug applied or use it less frequently.   The patient verbalized understanding of the proper use and possible adverse effects of topical sulfur application.  All of the patient's questions and concerns were addressed. Erythromycin Pregnancy And Lactation Text: This medication is Pregnancy Category B and is considered safe during pregnancy. It is also excreted in breast milk. Aklief Pregnancy And Lactation Text: It is unknown if this medication is safe to use during pregnancy.  It is unknown if this medication is excreted in breast milk.  Breastfeeding women should use the topical cream on the smallest area of the skin for the shortest time needed while breastfeeding.  Do not apply to nipple and areola. Azelaic Acid Pregnancy And Lactation Text: This medication is considered safe during pregnancy and breast feeding. Sarecycline Counseling: Patient advised regarding possible photosensitivity and discoloration of the teeth, skin, lips, tongue and gums.  Patient instructed to avoid sunlight, if possible.  When exposed to sunlight, patients should wear protective clothing, sunglasses, and sunscreen.  The patient was instructed to call the office immediately if the following severe adverse effects occur:  hearing changes, easy bruising/bleeding, severe headache, or vision changes.  The patient verbalized understanding of the proper use and possible adverse effects of sarecycline.  All of the patient's questions and concerns were addressed. Doxycycline Pregnancy And Lactation Text: This medication is Pregnancy Category D and not consider safe during pregnancy. It is also excreted in breast milk but is considered safe for shorter treatment courses. Bactrim Counseling:  I discussed with the patient the risks of sulfa antibiotics including but not limited to GI upset, allergic reaction, drug rash, diarrhea, dizziness, photosensitivity, and yeast infections.  Rarely, more serious reactions can occur including but not limited to aplastic anemia, agranulocytosis, methemoglobinemia, blood dyscrasias, liver or kidney failure, lung infiltrates or desquamative/blistering drug rashes. Topical Clindamycin Counseling: Patient counseled that this medication may cause skin irritation or allergic reactions.  In the event of skin irritation, the patient was advised to reduce the amount of the drug applied or use it less frequently.   The patient verbalized understanding of the proper use and possible adverse effects of clindamycin.  All of the patient's questions and concerns were addressed. Minocycline Counseling: Patient advised regarding possible photosensitivity and discoloration of the teeth, skin, lips, tongue and gums.  Patient instructed to avoid sunlight, if possible.  When exposed to sunlight, patients should wear protective clothing, sunglasses, and sunscreen.  The patient was instructed to call the office immediately if the following severe adverse effects occur:  hearing changes, easy bruising/bleeding, severe headache, or vision changes.  The patient verbalized understanding of the proper use and possible adverse effects of minocycline.  All of the patient's questions and concerns were addressed. Tazorac Counseling:  Patient advised that medication is irritating and drying.  Patient may need to apply sparingly and wash off after an hour before eventually leaving it on overnight.  The patient verbalized understanding of the proper use and possible adverse effects of tazorac.  All of the patient's questions and concerns were addressed. Azithromycin Counseling:  I discussed with the patient the risks of azithromycin including but not limited to GI upset, allergic reaction, drug rash, diarrhea, and yeast infections. Dapsone Pregnancy And Lactation Text: This medication is Pregnancy Category C and is not considered safe during pregnancy or breast feeding.

## 2024-01-17 NOTE — ED PROVIDER NOTE - OBJECTIVE STATEMENT
73-year-old male presents ED for evaluation of abdominal pain.  Patient states he was a patient in the ED last night patient had blood work and a CT scan.  Patient states he felt better and was DC'd home.  Today patient started having pain again after eating some toast and soup

## 2024-01-17 NOTE — ED ADULT NURSE NOTE - CHPI ED NUR SYMPTOMS POS
Pt presents to ED with c/o abdominal pain after eating. Pt seen in ED last night for similar complaint. Denies n/v/d, fever/PAIN

## 2024-01-17 NOTE — ED PROVIDER NOTE - NSFOLLOWUPINSTRUCTIONS_ED_ALL_ED_FT
FOLLOW UP WITH YOUR GASTROENTEROLOGIST. CALL FOR AN APPOINTMENT. YOU MAY NEED ENDOSCOPY IN THE NEAR FUTURE.     Abdominal Pain    Many things can cause abdominal pain. Usually, abdominal pain is not caused by a disease and will improve without treatment. Your health care provider will do a physical exam and possibly order blood tests and imaging to help determine the seriousness of your pain. However, in many cases, no cause may be found and you may need further testing as an outpatient. Monitor your abdominal pain for any changes.     SEEK IMMEDIATE MEDICAL CARE IF YOU HAVE THE FOLLOWING SYMPTOMS: worsening abdominal pain, vomiting, diarrhea, inability to have bowel movements or pass gas, black or bloody stool, fever accompanying chest pain or back pain, or dizziness/lightheadedness.    Hiatal Hernia  Upper body showing the esophagus, stomach, and diaphragm with close-ups of a normal stomach and one with a hiatal hernia.  A hiatal hernia occurs when part of the stomach slides above the muscle that separates the abdomen from the chest (diaphragm). A person can be born with a hiatal hernia (congenital), or it may develop over time. In almost all cases of hiatal hernia, only the top part of the stomach pushes through the diaphragm.    Many people have a hiatal hernia with no symptoms. The larger the hernia, the more likely it is that you will have symptoms. In some cases, a hiatal hernia allows stomach acid to flow back into the tube that carries food from your mouth to your stomach (esophagus). This may cause heartburn symptoms. The development of heartburn symptoms may mean that you have a condition called gastroesophageal reflux disease (GERD).    What are the causes?  This condition is caused by a weakness in the opening (hiatus) where the esophagus passes through the diaphragm to attach to the upper part of the stomach. A person may be born with a weakness in the hiatus, or a weakness can develop over time.    What increases the risk?  This condition is more likely to develop in:  Older people. Age is a major risk factor for a hiatal hernia, especially if you are over the age of 50.  Pregnant women.  People who are overweight.  People who have frequent constipation.  What are the signs or symptoms?  Symptoms of this condition usually develop in the form of GERD symptoms. Symptoms include:  Heartburn.  Upset stomach (indigestion).  Trouble swallowing.  Coughing or wheezing. Wheezing is making high-pitched whistling sounds when you breathe.  Sore throat.  Chest pain.  Nausea and vomiting.  How is this diagnosed?  This condition may be diagnosed during testing for GERD. Tests that may be done include:  X-rays of your stomach or chest.  An upper gastrointestinal (GI) series. This is an X-ray exam of your GI tract that is taken after you swallow a chalky liquid that shows up clearly on the X-ray.  Endoscopy. This is a procedure to look into your stomach using a thin, flexible tube that has a tiny camera and light on the end of it.  How is this treated?  This condition may be treated by:  Dietary and lifestyle changes to help reduce GERD symptoms.  Medicines. These may include:  Over-the-counter antacids.  Medicines that make your stomach empty more quickly.  Medicines that block the production of stomach acid (H2 blockers).  Stronger medicines to reduce stomach acid (proton pump inhibitors).  Surgery to repair the hernia, if other treatments are not helping.  If you have no symptoms, you may not need treatment.    Follow these instructions at home:  Lifestyle and activity    Do not use any products that contain nicotine or tobacco. These products include cigarettes, chewing tobacco, and vaping devices, such as e-cigarettes. If you need help quitting, ask your health care provider.  Try to achieve and maintain a healthy body weight.  Avoid putting pressure on your abdomen. Anything that puts pressure on your abdomen increases the amount of acid that may be pushed up into your esophagus.  Avoid bending over, especially after eating.  Raise the head of your bed by putting blocks under the legs. This keeps your head and esophagus higher than your stomach.  Do not wear tight clothing around your chest or stomach.  Try not to strain when having a bowel movement, when urinating, or when lifting heavy objects.  Eating and drinking    Avoid foods that can worsen GERD symptoms. These may include:  Fatty foods, like fried foods.  Citrus fruits, like oranges or lemon.  Other foods and drinks that contain acid, like orange juice or tomatoes.  Spicy food.  Chocolate.  Eat frequent small meals instead of three large meals a day. This helps prevent your stomach from getting too full.  Eat slowly.  Do not lie down right after eating.  Do not eat 1–2 hours before bed.  Do not drink beverages with caffeine. These include cola, coffee, cocoa, and tea.  Do not drink alcohol.  General instructions    Take over-the-counter and prescription medicines only as told by your health care provider.  Keep all follow-up visits. Your health care provider will want to check that any new prescribed medicines are helping your symptoms.  Contact a health care provider if:  Your symptoms are not controlled with medicines or lifestyle changes.  You are having trouble swallowing.  You have coughing or wheezing that will not go away.  Your pain is getting worse.  Your pain spreads to your arms, neck, jaw, teeth, or back.  You feel nauseous or you vomit.  Get help right away if:  You have shortness of breath.  You vomit blood.  You have bright red blood in your stools.  You have black, tarry stools.  These symptoms may be an emergency. Get help right away. Call 911.  Do not wait to see if the symptoms will go away.  Do not drive yourself to the hospital.  Summary  A hiatal hernia occurs when part of the stomach slides above the muscle that separates the abdomen from the chest.  A person may be born with a weakness in the hiatus, or a weakness can develop over time.  Symptoms of a hiatal hernia may include heartburn, trouble swallowing, or sore throat.  Management of a hiatal hernia includes eating frequent small meals instead of three large meals a day.  Get help right away if you vomit blood, have bright red blood in your stools, or have black, tarry stools.  This information is not intended to replace advice given to you by your health care provider. Make sure you discuss any questions you have with your health care provider.

## 2024-01-17 NOTE — ED PROVIDER NOTE - CLINICAL SUMMARY MEDICAL DECISION MAKING FREE TEXT BOX
Patient reassessed -- currently pain free, abdomen soft, NT.    Labs reviewed are unremarkable. CT without any acute intra-abdominal pathology, does demonstrate hiatal hernia.    EKG interpreted by me, NSR without ST T changes.     Suspect patient has underlying gastritis given location and quality of pain, normal imaging and labs. Not suggestive of biliary disease, anginal equivalent.     Adivsed on need for Gi follow up, endoscopy, follows with Dr. Francois, no recent EGD, had recent normal colonoscopy.     Will give course of carafate, advised on food journal, diet monitoring, return precautions, follow up.

## 2024-01-17 NOTE — ED ADULT NURSE NOTE - NSFALLUNIVINTERV_ED_ALL_ED
Bed/Stretcher in lowest position, wheels locked, appropriate side rails in place/Call bell, personal items and telephone in reach/Instruct patient to call for assistance before getting out of bed/chair/stretcher/Non-slip footwear applied when patient is off stretcher/[place] to call system/Physically safe environment - no spills, clutter or unnecessary equipment/Purposeful proactive rounding/Room/bathroom lighting operational, light cord in reach

## 2024-01-17 NOTE — ED PROVIDER NOTE - OBJECTIVE STATEMENT
72 yo M, hx of HLD, hiatal hernia, here for assessment of abdominal pain. Pain is bandlike across mid abdomen, worse in epigastric area. Began gradually about 3-4 hours ago. Associated with nausea, forced himself to vomit without resolution of sx. No dysuria, diarrhea. Is passing flatus without change in sx.    No fever, chills. No abdominal surgical hx.

## 2024-01-17 NOTE — ED PROVIDER NOTE - PHYSICAL EXAMINATION
VITAL SIGNS: I have reviewed nursing notes and confirm.  CONSTITUTIONAL: Well-developed; well-nourished; in no acute distress, in pain  SKIN: Skin exam is warm and dry, no acute rash.  HEAD: Normocephalic; atraumatic.  EYES: PERRL, EOM intact; conjunctiva and sclera clear.  ENT: No nasal discharge; airway clear. TMs clear.  NECK: Supple; non tender.  CARD: S1, S2 normal; no murmurs, gallops, or rubs. Regular rate and rhythm.  RESP: No wheezes, rales or rhonchi.  ABD: diffuse ttp, no rebound or guarding, ttp worse in epigastric area. + BS, no distension  EXT: Normal ROM.   NEURO: Alert, oriented. Grossly unremarkable. No focal deficits.  PSYCH: Cooperative, appropriate.

## 2024-01-17 NOTE — ED ADULT TRIAGE NOTE - CHIEF COMPLAINT QUOTE
Abdominal pain  Seen in the ER last night for same complaint and today after eating developed severe abdominal pain

## 2024-01-18 DIAGNOSIS — K81.9 CHOLECYSTITIS, UNSPECIFIED: ICD-10-CM

## 2024-01-18 LAB
ALBUMIN SERPL ELPH-MCNC: 3.8 G/DL — SIGNIFICANT CHANGE UP (ref 3.5–5.2)
ALP SERPL-CCNC: 105 U/L — SIGNIFICANT CHANGE UP (ref 30–115)
ALT FLD-CCNC: 16 U/L — SIGNIFICANT CHANGE UP (ref 0–41)
ANION GAP SERPL CALC-SCNC: 10 MMOL/L — SIGNIFICANT CHANGE UP (ref 7–14)
APTT BLD: 29.6 SEC — SIGNIFICANT CHANGE UP (ref 27–39.2)
AST SERPL-CCNC: 24 U/L — SIGNIFICANT CHANGE UP (ref 0–41)
BILIRUB DIRECT SERPL-MCNC: <0.2 MG/DL — SIGNIFICANT CHANGE UP (ref 0–0.3)
BILIRUB INDIRECT FLD-MCNC: >0.7 MG/DL — SIGNIFICANT CHANGE UP (ref 0.2–1.2)
BILIRUB SERPL-MCNC: 0.9 MG/DL — SIGNIFICANT CHANGE UP (ref 0.2–1.2)
BUN SERPL-MCNC: 13 MG/DL — SIGNIFICANT CHANGE UP (ref 10–20)
CALCIUM SERPL-MCNC: 9 MG/DL — SIGNIFICANT CHANGE UP (ref 8.4–10.5)
CHLORIDE SERPL-SCNC: 101 MMOL/L — SIGNIFICANT CHANGE UP (ref 98–110)
CO2 SERPL-SCNC: 26 MMOL/L — SIGNIFICANT CHANGE UP (ref 17–32)
CREAT SERPL-MCNC: 1.3 MG/DL — SIGNIFICANT CHANGE UP (ref 0.7–1.5)
CULTURE RESULTS: SIGNIFICANT CHANGE UP
EGFR: 59 ML/MIN/1.73M2 — LOW
GLUCOSE SERPL-MCNC: 119 MG/DL — HIGH (ref 70–99)
HCT VFR BLD CALC: 38.2 % — LOW (ref 42–52)
HCV AB S/CO SERPL IA: 0.04 COI — SIGNIFICANT CHANGE UP
HCV AB SERPL-IMP: SIGNIFICANT CHANGE UP
HGB BLD-MCNC: 13.5 G/DL — LOW (ref 14–18)
INR BLD: 1.18 RATIO — SIGNIFICANT CHANGE UP (ref 0.65–1.3)
LACTATE SERPL-SCNC: 1.4 MMOL/L — SIGNIFICANT CHANGE UP (ref 0.7–2)
MAGNESIUM SERPL-MCNC: 2.1 MG/DL — SIGNIFICANT CHANGE UP (ref 1.8–2.4)
MCHC RBC-ENTMCNC: 29.4 PG — SIGNIFICANT CHANGE UP (ref 27–31)
MCHC RBC-ENTMCNC: 35.3 G/DL — SIGNIFICANT CHANGE UP (ref 32–37)
MCV RBC AUTO: 83.2 FL — SIGNIFICANT CHANGE UP (ref 80–94)
NRBC # BLD: 0 /100 WBCS — SIGNIFICANT CHANGE UP (ref 0–0)
PHOSPHATE SERPL-MCNC: 3.7 MG/DL — SIGNIFICANT CHANGE UP (ref 2.1–4.9)
PLATELET # BLD AUTO: 193 K/UL — SIGNIFICANT CHANGE UP (ref 130–400)
PMV BLD: 10.8 FL — HIGH (ref 7.4–10.4)
POTASSIUM SERPL-MCNC: 4.7 MMOL/L — SIGNIFICANT CHANGE UP (ref 3.5–5)
POTASSIUM SERPL-SCNC: 4.7 MMOL/L — SIGNIFICANT CHANGE UP (ref 3.5–5)
PROT SERPL-MCNC: 6 G/DL — SIGNIFICANT CHANGE UP (ref 6–8)
PROTHROM AB SERPL-ACNC: 13.5 SEC — HIGH (ref 9.95–12.87)
RBC # BLD: 4.59 M/UL — LOW (ref 4.7–6.1)
RBC # FLD: 13.2 % — SIGNIFICANT CHANGE UP (ref 11.5–14.5)
SODIUM SERPL-SCNC: 137 MMOL/L — SIGNIFICANT CHANGE UP (ref 135–146)
SPECIMEN SOURCE: SIGNIFICANT CHANGE UP
WBC # BLD: 14.36 K/UL — HIGH (ref 4.8–10.8)
WBC # FLD AUTO: 14.36 K/UL — HIGH (ref 4.8–10.8)

## 2024-01-18 PROCEDURE — 80048 BASIC METABOLIC PNL TOTAL CA: CPT

## 2024-01-18 PROCEDURE — 83605 ASSAY OF LACTIC ACID: CPT

## 2024-01-18 PROCEDURE — 83735 ASSAY OF MAGNESIUM: CPT

## 2024-01-18 PROCEDURE — 84100 ASSAY OF PHOSPHORUS: CPT

## 2024-01-18 PROCEDURE — 99223 1ST HOSP IP/OBS HIGH 75: CPT | Mod: 57

## 2024-01-18 PROCEDURE — 80076 HEPATIC FUNCTION PANEL: CPT

## 2024-01-18 PROCEDURE — 88304 TISSUE EXAM BY PATHOLOGIST: CPT

## 2024-01-18 PROCEDURE — C1889: CPT

## 2024-01-18 PROCEDURE — 36415 COLL VENOUS BLD VENIPUNCTURE: CPT

## 2024-01-18 PROCEDURE — 85730 THROMBOPLASTIN TIME PARTIAL: CPT

## 2024-01-18 PROCEDURE — 85027 COMPLETE CBC AUTOMATED: CPT

## 2024-01-18 PROCEDURE — 86901 BLOOD TYPING SEROLOGIC RH(D): CPT

## 2024-01-18 PROCEDURE — 85025 COMPLETE CBC W/AUTO DIFF WBC: CPT

## 2024-01-18 PROCEDURE — 86900 BLOOD TYPING SEROLOGIC ABO: CPT

## 2024-01-18 PROCEDURE — S2900: CPT

## 2024-01-18 PROCEDURE — 86803 HEPATITIS C AB TEST: CPT

## 2024-01-18 PROCEDURE — 86850 RBC ANTIBODY SCREEN: CPT

## 2024-01-18 PROCEDURE — 80053 COMPREHEN METABOLIC PANEL: CPT

## 2024-01-18 PROCEDURE — 85610 PROTHROMBIN TIME: CPT

## 2024-01-18 RX ORDER — MORPHINE SULFATE 50 MG/1
2 CAPSULE, EXTENDED RELEASE ORAL EVERY 4 HOURS
Refills: 0 | Status: DISCONTINUED | OUTPATIENT
Start: 2024-01-18 | End: 2024-01-18

## 2024-01-18 RX ORDER — ONDANSETRON 8 MG/1
4 TABLET, FILM COATED ORAL EVERY 6 HOURS
Refills: 0 | Status: DISCONTINUED | OUTPATIENT
Start: 2024-01-18 | End: 2024-01-19

## 2024-01-18 RX ORDER — ROSUVASTATIN CALCIUM 5 MG/1
1 TABLET ORAL
Refills: 0 | DISCHARGE

## 2024-01-18 RX ORDER — PIPERACILLIN AND TAZOBACTAM 4; .5 G/20ML; G/20ML
3.38 INJECTION, POWDER, LYOPHILIZED, FOR SOLUTION INTRAVENOUS ONCE
Refills: 0 | Status: COMPLETED | OUTPATIENT
Start: 2024-01-18 | End: 2024-01-18

## 2024-01-18 RX ORDER — ATORVASTATIN CALCIUM 80 MG/1
20 TABLET, FILM COATED ORAL AT BEDTIME
Refills: 0 | Status: DISCONTINUED | OUTPATIENT
Start: 2024-01-18 | End: 2024-01-19

## 2024-01-18 RX ORDER — SODIUM CHLORIDE 9 MG/ML
1000 INJECTION INTRAMUSCULAR; INTRAVENOUS; SUBCUTANEOUS
Refills: 0 | Status: DISCONTINUED | OUTPATIENT
Start: 2024-01-18 | End: 2024-01-19

## 2024-01-18 RX ORDER — PIPERACILLIN AND TAZOBACTAM 4; .5 G/20ML; G/20ML
3.38 INJECTION, POWDER, LYOPHILIZED, FOR SOLUTION INTRAVENOUS EVERY 8 HOURS
Refills: 0 | Status: DISCONTINUED | OUTPATIENT
Start: 2024-01-18 | End: 2024-01-19

## 2024-01-18 RX ORDER — ACETAMINOPHEN 500 MG
650 TABLET ORAL ONCE
Refills: 0 | Status: COMPLETED | OUTPATIENT
Start: 2024-01-18 | End: 2024-01-18

## 2024-01-18 RX ORDER — MORPHINE SULFATE 50 MG/1
4 CAPSULE, EXTENDED RELEASE ORAL
Refills: 0 | Status: DISCONTINUED | OUTPATIENT
Start: 2024-01-18 | End: 2024-01-19

## 2024-01-18 RX ORDER — INFLUENZA VIRUS VACCINE 15; 15; 15; 15 UG/.5ML; UG/.5ML; UG/.5ML; UG/.5ML
0.7 SUSPENSION INTRAMUSCULAR ONCE
Refills: 0 | Status: DISCONTINUED | OUTPATIENT
Start: 2024-01-18 | End: 2024-01-20

## 2024-01-18 RX ADMIN — SODIUM CHLORIDE 100 MILLILITER(S): 9 INJECTION INTRAMUSCULAR; INTRAVENOUS; SUBCUTANEOUS at 22:54

## 2024-01-18 RX ADMIN — ATORVASTATIN CALCIUM 20 MILLIGRAM(S): 80 TABLET, FILM COATED ORAL at 22:57

## 2024-01-18 RX ADMIN — PIPERACILLIN AND TAZOBACTAM 25 GRAM(S): 4; .5 INJECTION, POWDER, LYOPHILIZED, FOR SOLUTION INTRAVENOUS at 22:54

## 2024-01-18 RX ADMIN — SODIUM CHLORIDE 100 MILLILITER(S): 9 INJECTION INTRAMUSCULAR; INTRAVENOUS; SUBCUTANEOUS at 12:35

## 2024-01-18 RX ADMIN — PIPERACILLIN AND TAZOBACTAM 25 GRAM(S): 4; .5 INJECTION, POWDER, LYOPHILIZED, FOR SOLUTION INTRAVENOUS at 15:03

## 2024-01-18 RX ADMIN — MORPHINE SULFATE 4 MILLIGRAM(S): 50 CAPSULE, EXTENDED RELEASE ORAL at 22:56

## 2024-01-18 RX ADMIN — Medication 650 MILLIGRAM(S): at 06:10

## 2024-01-18 RX ADMIN — PIPERACILLIN AND TAZOBACTAM 200 GRAM(S): 4; .5 INJECTION, POWDER, LYOPHILIZED, FOR SOLUTION INTRAVENOUS at 02:21

## 2024-01-18 RX ADMIN — MORPHINE SULFATE 4 MILLIGRAM(S): 50 CAPSULE, EXTENDED RELEASE ORAL at 01:38

## 2024-01-18 RX ADMIN — MORPHINE SULFATE 2 MILLIGRAM(S): 50 CAPSULE, EXTENDED RELEASE ORAL at 20:07

## 2024-01-18 RX ADMIN — SODIUM CHLORIDE 100 MILLILITER(S): 9 INJECTION INTRAMUSCULAR; INTRAVENOUS; SUBCUTANEOUS at 02:20

## 2024-01-18 NOTE — H&P ADULT - ASSESSMENT
73-year-old male with PMH of HLD, Hiatal hernia admitted for acute cholecystitis.    A/P:  -Pain mgmt  -IV hydration  -IV abx  -NPO  -HLD on lipitor  -GI and DVT prophylaxis

## 2024-01-18 NOTE — H&P ADULT - NSHPADDITIONALINFOADULT_GEN_ALL_CORE
I saw and examined the patient.  I agree with above assessment plan.  Have admitted him for further observation and IV antibiotics.  We had a discussion about the findings on his ultrasound which certainly are consistent with a clinical diagnosis of acute cholecystitis with gallbladder wall edema.  I offered him cholecystectomy.  He would like to think it over and possibly discussed with his primary care physician.  We will continue n.p.o. for now.

## 2024-01-18 NOTE — H&P ADULT - NSHPLABSRESULTS_GEN_ALL_CORE
14.6   18.36 )-----------( 248      ( 17 Jan 2024 18:10 )             42.2     01-17    137  |  100  |  14  ----------------------------<  113<H>  4.1   |  27  |  1.1    Ca    9.4      17 Jan 2024 18:10    TPro  6.8  /  Alb  4.2  /  TBili  0.7  /  DBili  x   /  AST  19  /  ALT  20  /  AlkPhos  124<H>  01-17          Urinalysis Basic - ( 17 Jan 2024 18:10 )    Color: x / Appearance: x / SG: x / pH: x  Gluc: 113 mg/dL / Ketone: x  / Bili: x / Urobili: x   Blood: x / Protein: x / Nitrite: x   Leuk Esterase: x / RBC: x / WBC x   Sq Epi: x / Non Sq Epi: x / Bacteria: x          < from: CT Angio Abdomen and Pelvis w/ IV Cont (01.17.24 @ 22:11) >    IMPRESSION:    Since CT abdomen pelvis performed at 1:50 AM;    No CTA evidence of mesenteric thromboembolism.    Increased inflammatory fat stranding surrounding the gallbladder,   correlating with recent ultrasound findings.      < end of copied text >    < from: US Abdomen Upper Quadrant Right (01.17.24 @ 20:49) >    IMPRESSION:    Gallbladder sludge and wall thickening up to 6 mm. No sonographic   Alexander's sign or pericholecystic fluid to suggest cholecystitis.    < end of copied text >

## 2024-01-18 NOTE — CHART NOTE - NSCHARTNOTEFT_GEN_A_CORE
pt had sandwich for lunch and   abd pain occurred with nausea     pt now elects to have ccy     pt made npo p mn   and added on to schedule for OR 1/19/2024    ivabx and pain meds on board

## 2024-01-18 NOTE — H&P ADULT - HISTORY OF PRESENT ILLNESS
73-year-old male with PMH of HLD, Hiatal hernia seen in ED yesterday for epigastric pain radiating to left upper quadrant after eating a pork chop and diez. Discharged after labs and abdominal CT which showed hiatal hernia, and also, pain improved. Pt returns today with same pain. Denies fever/chills, no chest pain, passing flatus. Pt admits to nausea/vomiting yesterday but not today. Last colonoscopy was about a year ago and ok per pt.

## 2024-01-18 NOTE — H&P ADULT - NSHPPHYSICALEXAM_GEN_ALL_CORE
Vital Signs Last 24 Hrs  T(C): 36.1 (17 Jan 2024 17:02), Max: 36.1 (17 Jan 2024 17:02)  T(F): 97 (17 Jan 2024 17:02), Max: 97 (17 Jan 2024 17:02)  HR: 89 (17 Jan 2024 21:35) (74 - 94)  BP: 141/85 (17 Jan 2024 21:35) (141/85 - 155/86)  BP(mean): --  RR: 18 (17 Jan 2024 21:35) (18 - 18)  SpO2: 99% (17 Jan 2024 21:35) (97% - 99%)    Parameters below as of 17 Jan 2024 21:35  Patient On (Oxygen Delivery Method): room air          Constitutional: NAD, A&O x3    Eyes: PERRLA, no conjuctivitis    Neck: no lymphadenopathy    Respiratory: +air entry, no rales, no rhonchi, no wheezes    Cardiovascular: +S1 and S2, regular rate and rhythm    Gastrointestinal: +BS, soft, tender at epigastrium, no Alexander's, not distended    Extremities:  no edema, no calf tenderness    Neurological: sensation intact, ROM equal B/L, CN II-XII intact    Skin: no rashes, normal turgor    Lymph Nodes: no lymphadenopathy

## 2024-01-19 ENCOUNTER — TRANSCRIPTION ENCOUNTER (OUTPATIENT)
Age: 74
End: 2024-01-19

## 2024-01-19 ENCOUNTER — RESULT REVIEW (OUTPATIENT)
Age: 74
End: 2024-01-19

## 2024-01-19 LAB
ALBUMIN SERPL ELPH-MCNC: 3.6 G/DL — SIGNIFICANT CHANGE UP (ref 3.5–5.2)
ALP SERPL-CCNC: 104 U/L — SIGNIFICANT CHANGE UP (ref 30–115)
ALT FLD-CCNC: 26 U/L — SIGNIFICANT CHANGE UP (ref 0–41)
ANION GAP SERPL CALC-SCNC: 7 MMOL/L — SIGNIFICANT CHANGE UP (ref 7–14)
AST SERPL-CCNC: 32 U/L — SIGNIFICANT CHANGE UP (ref 0–41)
BILIRUB SERPL-MCNC: 0.9 MG/DL — SIGNIFICANT CHANGE UP (ref 0.2–1.2)
BUN SERPL-MCNC: 14 MG/DL — SIGNIFICANT CHANGE UP (ref 10–20)
CALCIUM SERPL-MCNC: 8.7 MG/DL — SIGNIFICANT CHANGE UP (ref 8.4–10.5)
CHLORIDE SERPL-SCNC: 102 MMOL/L — SIGNIFICANT CHANGE UP (ref 98–110)
CO2 SERPL-SCNC: 29 MMOL/L — SIGNIFICANT CHANGE UP (ref 17–32)
CREAT SERPL-MCNC: 1.1 MG/DL — SIGNIFICANT CHANGE UP (ref 0.7–1.5)
EGFR: 72 ML/MIN/1.73M2 — SIGNIFICANT CHANGE UP
GLUCOSE SERPL-MCNC: 97 MG/DL — SIGNIFICANT CHANGE UP (ref 70–99)
HCT VFR BLD CALC: 38.2 % — LOW (ref 42–52)
HGB BLD-MCNC: 13 G/DL — LOW (ref 14–18)
MAGNESIUM SERPL-MCNC: 2.3 MG/DL — SIGNIFICANT CHANGE UP (ref 1.8–2.4)
MCHC RBC-ENTMCNC: 28.7 PG — SIGNIFICANT CHANGE UP (ref 27–31)
MCHC RBC-ENTMCNC: 34 G/DL — SIGNIFICANT CHANGE UP (ref 32–37)
MCV RBC AUTO: 84.3 FL — SIGNIFICANT CHANGE UP (ref 80–94)
NRBC # BLD: 0 /100 WBCS — SIGNIFICANT CHANGE UP (ref 0–0)
PLATELET # BLD AUTO: 184 K/UL — SIGNIFICANT CHANGE UP (ref 130–400)
PMV BLD: 11 FL — HIGH (ref 7.4–10.4)
POTASSIUM SERPL-MCNC: 4.4 MMOL/L — SIGNIFICANT CHANGE UP (ref 3.5–5)
POTASSIUM SERPL-SCNC: 4.4 MMOL/L — SIGNIFICANT CHANGE UP (ref 3.5–5)
PROT SERPL-MCNC: 5.8 G/DL — LOW (ref 6–8)
RBC # BLD: 4.53 M/UL — LOW (ref 4.7–6.1)
RBC # FLD: 12.9 % — SIGNIFICANT CHANGE UP (ref 11.5–14.5)
SODIUM SERPL-SCNC: 138 MMOL/L — SIGNIFICANT CHANGE UP (ref 135–146)
WBC # BLD: 12.85 K/UL — HIGH (ref 4.8–10.8)
WBC # FLD AUTO: 12.85 K/UL — HIGH (ref 4.8–10.8)

## 2024-01-19 PROCEDURE — 88304 TISSUE EXAM BY PATHOLOGIST: CPT | Mod: 26

## 2024-01-19 PROCEDURE — S2900 ROBOTIC SURGICAL SYSTEM: CPT | Mod: NC

## 2024-01-19 PROCEDURE — 47562 LAPAROSCOPIC CHOLECYSTECTOMY: CPT

## 2024-01-19 RX ORDER — HYDROMORPHONE HYDROCHLORIDE 2 MG/ML
0.5 INJECTION INTRAMUSCULAR; INTRAVENOUS; SUBCUTANEOUS
Refills: 0 | Status: DISCONTINUED | OUTPATIENT
Start: 2024-01-19 | End: 2024-01-20

## 2024-01-19 RX ORDER — ACETAMINOPHEN 500 MG
650 TABLET ORAL EVERY 6 HOURS
Refills: 0 | Status: DISCONTINUED | OUTPATIENT
Start: 2024-01-19 | End: 2024-01-20

## 2024-01-19 RX ORDER — SODIUM CHLORIDE 9 MG/ML
1000 INJECTION INTRAMUSCULAR; INTRAVENOUS; SUBCUTANEOUS
Refills: 0 | Status: DISCONTINUED | OUTPATIENT
Start: 2024-01-19 | End: 2024-01-20

## 2024-01-19 RX ORDER — SODIUM CHLORIDE 9 MG/ML
1000 INJECTION, SOLUTION INTRAVENOUS
Refills: 0 | Status: DISCONTINUED | OUTPATIENT
Start: 2024-01-19 | End: 2024-01-19

## 2024-01-19 RX ORDER — ENOXAPARIN SODIUM 100 MG/ML
40 INJECTION SUBCUTANEOUS EVERY 24 HOURS
Refills: 0 | Status: DISCONTINUED | OUTPATIENT
Start: 2024-01-19 | End: 2024-01-20

## 2024-01-19 RX ORDER — IBUPROFEN 200 MG
600 TABLET ORAL EVERY 6 HOURS
Refills: 0 | Status: DISCONTINUED | OUTPATIENT
Start: 2024-01-19 | End: 2024-01-20

## 2024-01-19 RX ORDER — ONDANSETRON 8 MG/1
4 TABLET, FILM COATED ORAL EVERY 6 HOURS
Refills: 0 | Status: DISCONTINUED | OUTPATIENT
Start: 2024-01-19 | End: 2024-01-20

## 2024-01-19 RX ORDER — HYDROMORPHONE HYDROCHLORIDE 2 MG/ML
0.5 INJECTION INTRAMUSCULAR; INTRAVENOUS; SUBCUTANEOUS
Refills: 0 | Status: DISCONTINUED | OUTPATIENT
Start: 2024-01-19 | End: 2024-01-19

## 2024-01-19 RX ORDER — ATORVASTATIN CALCIUM 80 MG/1
20 TABLET, FILM COATED ORAL AT BEDTIME
Refills: 0 | Status: DISCONTINUED | OUTPATIENT
Start: 2024-01-19 | End: 2024-01-20

## 2024-01-19 RX ORDER — PIPERACILLIN AND TAZOBACTAM 4; .5 G/20ML; G/20ML
3.38 INJECTION, POWDER, LYOPHILIZED, FOR SOLUTION INTRAVENOUS EVERY 8 HOURS
Refills: 0 | Status: DISCONTINUED | OUTPATIENT
Start: 2024-01-20 | End: 2024-01-20

## 2024-01-19 RX ADMIN — Medication 650 MILLIGRAM(S): at 22:19

## 2024-01-19 RX ADMIN — SODIUM CHLORIDE 100 MILLILITER(S): 9 INJECTION INTRAMUSCULAR; INTRAVENOUS; SUBCUTANEOUS at 22:20

## 2024-01-19 RX ADMIN — MORPHINE SULFATE 2 MILLIGRAM(S): 50 CAPSULE, EXTENDED RELEASE ORAL at 03:31

## 2024-01-19 RX ADMIN — PIPERACILLIN AND TAZOBACTAM 25 GRAM(S): 4; .5 INJECTION, POWDER, LYOPHILIZED, FOR SOLUTION INTRAVENOUS at 14:41

## 2024-01-19 RX ADMIN — Medication 650 MILLIGRAM(S): at 23:28

## 2024-01-19 RX ADMIN — MORPHINE SULFATE 4 MILLIGRAM(S): 50 CAPSULE, EXTENDED RELEASE ORAL at 03:35

## 2024-01-19 RX ADMIN — Medication 600 MILLIGRAM(S): at 23:28

## 2024-01-19 RX ADMIN — SODIUM CHLORIDE 75 MILLILITER(S): 9 INJECTION, SOLUTION INTRAVENOUS at 18:35

## 2024-01-19 RX ADMIN — ATORVASTATIN CALCIUM 20 MILLIGRAM(S): 80 TABLET, FILM COATED ORAL at 22:19

## 2024-01-19 RX ADMIN — MORPHINE SULFATE 4 MILLIGRAM(S): 50 CAPSULE, EXTENDED RELEASE ORAL at 03:31

## 2024-01-19 RX ADMIN — PIPERACILLIN AND TAZOBACTAM 25 GRAM(S): 4; .5 INJECTION, POWDER, LYOPHILIZED, FOR SOLUTION INTRAVENOUS at 06:22

## 2024-01-19 RX ADMIN — Medication 600 MILLIGRAM(S): at 22:19

## 2024-01-19 NOTE — PROGRESS NOTE ADULT - SUBJECTIVE AND OBJECTIVE BOX
73-year-old male with PMH of HLD, Hiatal hernia   73-year-old male with PMH of HLD, Hiatal hernia  with  Acute cholecystitis   s/p  ·  PROCEDURES:  Robot-assisted cholecystectomy 1  Operative Findings:  · Operative Findings	Robot assisted laparoscopic cholecystectomy. Acute severe cholecystitis. Critical view of safety achieved. Cystic duct clipped x3. Hemostasis achieved. Copious irrigation performed.  seen and  examined  in bed  awake/ alert  no complaints  on clears  + voided  v/s T96.4,  /80,   HR  87,   RR 18  PE  AXOX3  PULM  CTA  CVS S1S2  ABD  + BS  SOFT  INCISIONS CDI  A&P:  Acute cholecystitis   s/p  ·  PROCEDURES:  Robot-assisted cholecystectomy   CLEAR LIQUIDS  ON ZOSYN  TYLENOL PRN  ZOFRAN PRN  WILL FOLLOW

## 2024-01-19 NOTE — BRIEF OPERATIVE NOTE - OPERATION/FINDINGS
Robot assisted laparoscopic cholecystectomy. Acute severe cholecystitis. Critical view of safety achieved. Cystic duct clipped x3. Hemostasis achieved. Copious irrigation performed.

## 2024-01-19 NOTE — PROGRESS NOTE ADULT - SUBJECTIVE AND OBJECTIVE BOX
pt seen   still with abd pain mainly epigastric   no nvd/ fever  planned for surgery today     Vital Signs Last 24 Hrs  T(C): 36.6 (19 Jan 2024 04:50), Max: 36.9 (18 Jan 2024 20:10)  T(F): 97.8 (19 Jan 2024 04:50), Max: 98.5 (18 Jan 2024 20:10)  HR: 90 (19 Jan 2024 04:50) (90 - 95)  BP: 107/57 (19 Jan 2024 04:50) (107/57 - 146/73)  BP(mean): --  RR: 18 (19 Jan 2024 04:50) (18 - 18)  SpO2: 95% (19 Jan 2024 04:50) (95% - 98%)    Parameters below as of 19 Jan 2024 04:50  Patient On (Oxygen Delivery Method): room air    PE:  chest: cta b/l  cardio: s1s2 heard  abd: bs+ ttp in upper abd , no rt     labs pending     a/p   acute cholecystitis     - npo   - CCY today with dr salas   - will review labs prior to case  - oob  pt seen   still with abd pain mainly epigastric   no nvd/ fever  plan for surgery today     Vital Signs Last 24 Hrs  T(C): 36.6 (19 Jan 2024 04:50), Max: 36.9 (18 Jan 2024 20:10)  T(F): 97.8 (19 Jan 2024 04:50), Max: 98.5 (18 Jan 2024 20:10)  HR: 90 (19 Jan 2024 04:50) (90 - 95)  BP: 107/57 (19 Jan 2024 04:50) (107/57 - 146/73)  BP(mean): --  RR: 18 (19 Jan 2024 04:50) (18 - 18)  SpO2: 95% (19 Jan 2024 04:50) (95% - 98%)    Parameters below as of 19 Jan 2024 04:50  Patient On (Oxygen Delivery Method): room air    PE:  chest: cta b/l  cardio: s1s2 heard  abd: bs+ ttp in upper abd , no rt     labs pending     a/p   acute cholecystitis     - npo   - CCY today with dr salas   - will review labs prior to case  - oob

## 2024-01-19 NOTE — PROGRESS NOTE ADULT - NSPROGADDITIONALINFOA_GEN_ALL_CORE
I have seen and examined the patient.  I agree with above assessment plan.  The patient failed a diet trial yesterday.  Plan 4 OR today.  Risks and benefits were described at length and the patient signed informed consent.  Routine robotic minimally invasive cholecystectomy with ICG. Continue antibiotics.  Likely discharge tomorrow pending operative findings.

## 2024-01-20 ENCOUNTER — TRANSCRIPTION ENCOUNTER (OUTPATIENT)
Age: 74
End: 2024-01-20

## 2024-01-20 VITALS
TEMPERATURE: 96 F | DIASTOLIC BLOOD PRESSURE: 61 MMHG | SYSTOLIC BLOOD PRESSURE: 139 MMHG | RESPIRATION RATE: 18 BRPM | HEART RATE: 80 BPM

## 2024-01-20 LAB
ANION GAP SERPL CALC-SCNC: 10 MMOL/L — SIGNIFICANT CHANGE UP (ref 7–14)
BASOPHILS # BLD AUTO: 0.02 K/UL — SIGNIFICANT CHANGE UP (ref 0–0.2)
BASOPHILS NFR BLD AUTO: 0.1 % — SIGNIFICANT CHANGE UP (ref 0–1)
BUN SERPL-MCNC: 16 MG/DL — SIGNIFICANT CHANGE UP (ref 10–20)
CALCIUM SERPL-MCNC: 8.6 MG/DL — SIGNIFICANT CHANGE UP (ref 8.4–10.5)
CHLORIDE SERPL-SCNC: 102 MMOL/L — SIGNIFICANT CHANGE UP (ref 98–110)
CO2 SERPL-SCNC: 24 MMOL/L — SIGNIFICANT CHANGE UP (ref 17–32)
CREAT SERPL-MCNC: 1 MG/DL — SIGNIFICANT CHANGE UP (ref 0.7–1.5)
EGFR: 80 ML/MIN/1.73M2 — SIGNIFICANT CHANGE UP
EOSINOPHIL # BLD AUTO: 0 K/UL — SIGNIFICANT CHANGE UP (ref 0–0.7)
EOSINOPHIL NFR BLD AUTO: 0 % — SIGNIFICANT CHANGE UP (ref 0–8)
GLUCOSE SERPL-MCNC: 139 MG/DL — HIGH (ref 70–99)
HCT VFR BLD CALC: 36.7 % — LOW (ref 42–52)
HGB BLD-MCNC: 13 G/DL — LOW (ref 14–18)
IMM GRANULOCYTES NFR BLD AUTO: 0.4 % — HIGH (ref 0.1–0.3)
LYMPHOCYTES # BLD AUTO: 0.62 K/UL — LOW (ref 1.2–3.4)
LYMPHOCYTES # BLD AUTO: 4.4 % — LOW (ref 20.5–51.1)
MCHC RBC-ENTMCNC: 29.5 PG — SIGNIFICANT CHANGE UP (ref 27–31)
MCHC RBC-ENTMCNC: 35.4 G/DL — SIGNIFICANT CHANGE UP (ref 32–37)
MCV RBC AUTO: 83.2 FL — SIGNIFICANT CHANGE UP (ref 80–94)
MONOCYTES # BLD AUTO: 0.41 K/UL — SIGNIFICANT CHANGE UP (ref 0.1–0.6)
MONOCYTES NFR BLD AUTO: 2.9 % — SIGNIFICANT CHANGE UP (ref 1.7–9.3)
NEUTROPHILS # BLD AUTO: 13.09 K/UL — HIGH (ref 1.4–6.5)
NEUTROPHILS NFR BLD AUTO: 92.2 % — HIGH (ref 42.2–75.2)
NRBC # BLD: 0 /100 WBCS — SIGNIFICANT CHANGE UP (ref 0–0)
PLATELET # BLD AUTO: 182 K/UL — SIGNIFICANT CHANGE UP (ref 130–400)
PMV BLD: 11 FL — HIGH (ref 7.4–10.4)
POTASSIUM SERPL-MCNC: 4 MMOL/L — SIGNIFICANT CHANGE UP (ref 3.5–5)
POTASSIUM SERPL-SCNC: 4 MMOL/L — SIGNIFICANT CHANGE UP (ref 3.5–5)
RBC # BLD: 4.41 M/UL — LOW (ref 4.7–6.1)
RBC # FLD: 12.8 % — SIGNIFICANT CHANGE UP (ref 11.5–14.5)
SODIUM SERPL-SCNC: 136 MMOL/L — SIGNIFICANT CHANGE UP (ref 135–146)
WBC # BLD: 14.2 K/UL — HIGH (ref 4.8–10.8)
WBC # FLD AUTO: 14.2 K/UL — HIGH (ref 4.8–10.8)

## 2024-01-20 RX ADMIN — PIPERACILLIN AND TAZOBACTAM 25 GRAM(S): 4; .5 INJECTION, POWDER, LYOPHILIZED, FOR SOLUTION INTRAVENOUS at 05:36

## 2024-01-20 RX ADMIN — Medication 600 MILLIGRAM(S): at 05:36

## 2024-01-20 RX ADMIN — Medication 600 MILLIGRAM(S): at 05:45

## 2024-01-20 RX ADMIN — Medication 650 MILLIGRAM(S): at 05:36

## 2024-01-20 RX ADMIN — Medication 650 MILLIGRAM(S): at 05:45

## 2024-01-20 NOTE — PROGRESS NOTE ADULT - SUBJECTIVE AND OBJECTIVE BOX
Subjective:  73-year old male admitted for acute cholecystitis, 1 day s/p robotic-assisted cholecystectomy.  Patient is doing well this AM and asking when he can go home. He states his abdominal pain is much improved. He is tolerating clears well.  He has been out of bed/ambulating in his room. He is passing gas.  He denies nausea, vomiting, diarrhea and fever.      Objective:  Vital Signs Last 24 Hrs  T(C): 35.6 (20 Jan 2024 04:52), Max: 37.7 (19 Jan 2024 12:51)  T(F): 96.1 (20 Jan 2024 04:52), Max: 99.8 (19 Jan 2024 12:51)  HR: 80 (20 Jan 2024 04:52) (80 - 96)  BP: 139/61 (20 Jan 2024 04:52) (100/58 - 140/78)  BP(mean): 84 (19 Jan 2024 17:50) (83 - 84)  RR: 18 (20 Jan 2024 04:52) (16 - 19)  SpO2: 93% (20 Jan 2024 00:44) (93% - 97%)  Patient On (Oxygen Delivery Method): room air    Physical exam:  Gen: well-appearing male, sitting up in bed, in no acute distress  Abd: soft, non-tender, non-distended, normoactive bowel sounds in all quadrants    labs:  CBC:            13.0   12.85 )-----------( 184      ( 01-19-24 @ 04:30 )             38.2         Chem:         ( 01-19-24 @ 04:30 )    138  |  102  |  14  ----------------------------<  97  4.4   |  29  |  1.1        Liver Functions: ( 01-19-24 @ 04:30 )  Alb: 3.6 g/dL / Pro: 5.8 g/dL / ALK PHOS: 104 U/L / ALT: 26 U/L / AST: 32 U/L / GGT: x          Procedures:  Robot-assisted cholecystectomy 19-Jan-2024 17:58:21    · Operative Findings  Robot assisted laparoscopic cholecystectomy. Acute severe cholecystitis. Critical view of safety achieved. Cystic duct clipped x3. Hemostasis achieved. Copious irrigation performed.    MEDICATIONS  (STANDING):  acetaminophen     Tablet .. 650 milliGRAM(s) Oral every 6 hours  atorvastatin 20 milliGRAM(s) Oral at bedtime  enoxaparin Injectable 40 milliGRAM(s) SubCutaneous every 24 hours  ibuprofen  Tablet. 600 milliGRAM(s) Oral every 6 hours  influenza  Vaccine (HIGH DOSE) 0.7 milliLiter(s) IntraMuscular once  piperacillin/tazobactam IVPB.. 3.375 Gram(s) IV Intermittent every 8 hours  sodium chloride 0.9%. 1000 milliLiter(s) (100 mL/Hr) IV Continuous <Continuous>    MEDICATIONS  (PRN):  HYDROmorphone  Injectable 0.5 milliGRAM(s) IV Push every 3 hours PRN Severe Pain (7 - 10)  ondansetron Injectable 4 milliGRAM(s) IV Push every 6 hours PRN Nausea and/or Vomiting

## 2024-01-20 NOTE — PROGRESS NOTE ADULT - ASSESSMENT
73-year-old male with PMH of HLD, Hiatal hernia, admitted for acute cholecystitis, is now 1 day s/p robotic-assisted cholecystectomy.  Patient has significant improvement in abdominal pain, is tolerating liquid diet.    Plan:  Acute cholecystitis,1 day s/p robotic-assisted cholecystectomy  -Discharge today  -Advance diet as tolerated  -Begin 5 days augmentin PO  -Continue home medications (rosuvastatin and sucralfate)  -Encourage ambulation  -Remove bandages, leave steri strips and can shower tomorrow  -F/u with Dr. Baxter outpatient  -Seen with Dr. Flynn

## 2024-01-20 NOTE — DISCHARGE NOTE PROVIDER - NSDCCPCAREPLAN_GEN_ALL_CORE_FT
PRINCIPAL DISCHARGE DIAGNOSIS  Diagnosis: Cholecystitis  Assessment and Plan of Treatment: Activity: No heavy lifting > 10 lbs for 2 weeks. Avoid straining or excessive activity x 6 weeks.   Dressings: Remove outer dressings in 48 hours and steri strips underneath will fall off on their own. Do not scrub wounds. You may shower but do not bathe. May use ice packs for pain and swelling.   Pain control: You may take over-the-counter tylenol and motrin three times per day with food for up to 3 days.   Follow up: Please call the number provided to make an appointment with Dr. Baxter  in 1-2 weeks. Please call with any questions or concerns including fevers, worsening pain, pus from the wounds, or redness of the skin.   resume all previous medications

## 2024-01-20 NOTE — DISCHARGE NOTE PROVIDER - HOSPITAL COURSE
73-year-old male with PMH of HLD, Hiatal hernia seen in ED yesterday for epigastric pain radiating to left upper quadrant after eating a pork chop and diez. Discharged after labs and abdominal CT which showed hiatal hernia, and also, pain improved. Pt returns today with same pain. Denies fever/chills, no chest pain, passing flatus.  clinically and objective findings , pt has cholecystitis     taken to OR   Robot-assisted cholecystectomy 19-Jan-2024  · Operative Findings	Robot assisted laparoscopic cholecystectomy. Acute severe cholecystitis. Critical view of safety achieved. Cystic duct clipped x3. Hemostasis achieved. Copious irrigation performed.    pod 1  pt doing well   [place] diet   no fever   ambulated   wants to go home   will d/c with close f/u and po abx x 5 days

## 2024-01-20 NOTE — DISCHARGE NOTE PROVIDER - CARE PROVIDER_API CALL
Paul Baxter  Surgery  [address] [place] [address], [address]  [place] [address]  Phone: [phone]  Fax: [phone]  Follow Up Time:

## 2024-01-20 NOTE — PROGRESS NOTE ADULT - NS ATTEND AMEND GEN_ALL_CORE FT
Pt seen and examined on am rounds with surgical PA's  A&P as noted above.  Ready for discharge, local care/diet/activity reviewed.  F/U with Dr. Baxter as above

## 2024-01-20 NOTE — DISCHARGE NOTE PROVIDER - NSDCMRMEDTOKEN_GEN_ALL_CORE_FT
amoxicillin-clavulanate 875 mg-125 mg oral tablet: 875 milligram(s) orally 2 times a day  rosuvastatin 10 mg oral capsule: 1 cap(s) orally  sucralfate 1 g oral tablet: 1 tab(s) orally 4 times a day (with meals and at bedtime)

## 2024-01-20 NOTE — DISCHARGE NOTE NURSING/CASE MANAGEMENT/SOCIAL WORK - PATIENT PORTAL LINK FT
You can access the FollowMyHealth Patient Portal offered by [facility] by registering at the following website: [email or web address]. By joining [facility]’s FollowMyHealth portal, you will also be able to view your health information using other applications (apps) compatible with our system.

## 2024-01-20 NOTE — DISCHARGE NOTE NURSING/CASE MANAGEMENT/SOCIAL WORK - NSDCPEFALRISK_GEN_ALL_CORE
For information on Fall & Injury Prevention, visit: [email or web address] OR  [email or web address] OR  https://www.cdc.gov/steadi/patient.html

## 2024-01-25 DIAGNOSIS — K82.1 HYDROPS OF GALLBLADDER: ICD-10-CM

## 2024-01-25 DIAGNOSIS — K29.80 DUODENITIS WITHOUT BLEEDING: ICD-10-CM

## 2024-01-25 DIAGNOSIS — E78.5 HYPERLIPIDEMIA, UNSPECIFIED: ICD-10-CM

## 2024-01-25 DIAGNOSIS — K44.9 DIAPHRAGMATIC HERNIA WITHOUT OBSTRUCTION OR GANGRENE: ICD-10-CM

## 2024-01-25 DIAGNOSIS — K81.0 ACUTE CHOLECYSTITIS: ICD-10-CM

## 2024-01-29 PROBLEM — E78.5 HYPERLIPIDEMIA, UNSPECIFIED: Chronic | Status: ACTIVE | Noted: 2024-01-17

## 2024-02-02 ENCOUNTER — APPOINTMENT (OUTPATIENT)
Dept: SURGERY | Facility: CLINIC | Age: 74
End: 2024-02-02
Payer: MEDICARE

## 2024-02-02 PROCEDURE — 99024 POSTOP FOLLOW-UP VISIT: CPT

## 2024-02-02 NOTE — HISTORY OF PRESENT ILLNESS
[de-identified] : A very pleasant patient s/p robotic cholecystectomy for severe acute on chronic cholecystitis.  Pathology demonstrates same with quite severe disease.  Currently he is healed up quite nicely and feels well.  He is tolerating a normal diet and resuming most of his day-to-day activities.  His incisions are healed well with small scars.  All his questions were answered today.  Is has been a pleasure assisting in the care of this nice patient.

## 2024-02-02 NOTE — PLAN
[FreeTextEntry1] : Follow-up primary and as needed.  All his questions were answered.  It has been a pleasure assisting in his care.  Thank you

## 2024-04-30 ENCOUNTER — NON-APPOINTMENT (OUTPATIENT)
Age: 74
End: 2024-04-30

## 2024-10-14 ENCOUNTER — INPATIENT (INPATIENT)
Facility: HOSPITAL | Age: 74
LOS: 2 days | Discharge: HOME | DRG: 440 | End: 2024-10-17
Attending: STUDENT IN AN ORGANIZED HEALTH CARE EDUCATION/TRAINING PROGRAM | Admitting: INTERNAL MEDICINE
Payer: MEDICARE

## 2024-10-14 VITALS
TEMPERATURE: 98 F | WEIGHT: 197.98 LBS | RESPIRATION RATE: 16 BRPM | SYSTOLIC BLOOD PRESSURE: 129 MMHG | HEART RATE: 114 BPM | DIASTOLIC BLOOD PRESSURE: 84 MMHG | OXYGEN SATURATION: 99 %

## 2024-10-14 LAB
ALBUMIN SERPL ELPH-MCNC: 4.2 G/DL — SIGNIFICANT CHANGE UP (ref 3.5–5.2)
ALP SERPL-CCNC: 247 U/L — HIGH (ref 30–115)
ALT FLD-CCNC: 300 U/L — HIGH (ref 0–41)
ANION GAP SERPL CALC-SCNC: 14 MMOL/L — SIGNIFICANT CHANGE UP (ref 7–14)
AST SERPL-CCNC: 114 U/L — HIGH (ref 0–41)
BASOPHILS # BLD AUTO: 0.02 K/UL — SIGNIFICANT CHANGE UP (ref 0–0.2)
BASOPHILS NFR BLD AUTO: 0.1 % — SIGNIFICANT CHANGE UP (ref 0–1)
BILIRUB DIRECT SERPL-MCNC: 1.2 MG/DL — HIGH (ref 0–0.3)
BILIRUB INDIRECT FLD-MCNC: 1.2 MG/DL — SIGNIFICANT CHANGE UP (ref 0.2–1.2)
BILIRUB SERPL-MCNC: 2.4 MG/DL — HIGH (ref 0.2–1.2)
BLD GP AB SCN SERPL QL: SIGNIFICANT CHANGE UP
BUN SERPL-MCNC: 16 MG/DL — SIGNIFICANT CHANGE UP (ref 10–20)
CALCIUM SERPL-MCNC: 9.1 MG/DL — SIGNIFICANT CHANGE UP (ref 8.4–10.5)
CHLORIDE SERPL-SCNC: 97 MMOL/L — LOW (ref 98–110)
CO2 SERPL-SCNC: 24 MMOL/L — SIGNIFICANT CHANGE UP (ref 17–32)
CREAT SERPL-MCNC: 1.4 MG/DL — SIGNIFICANT CHANGE UP (ref 0.7–1.5)
EGFR: 53 ML/MIN/1.73M2 — LOW
EOSINOPHIL # BLD AUTO: 0 K/UL — SIGNIFICANT CHANGE UP (ref 0–0.7)
EOSINOPHIL NFR BLD AUTO: 0 % — SIGNIFICANT CHANGE UP (ref 0–8)
GLUCOSE SERPL-MCNC: 112 MG/DL — HIGH (ref 70–99)
HCT VFR BLD CALC: 43.3 % — SIGNIFICANT CHANGE UP (ref 42–52)
HGB BLD-MCNC: 14.9 G/DL — SIGNIFICANT CHANGE UP (ref 14–18)
IMM GRANULOCYTES NFR BLD AUTO: 0.3 % — SIGNIFICANT CHANGE UP (ref 0.1–0.3)
LACTATE SERPL-SCNC: 1.7 MMOL/L — SIGNIFICANT CHANGE UP (ref 0.7–2)
LIDOCAIN IGE QN: 1164 U/L — HIGH (ref 7–60)
LYMPHOCYTES # BLD AUTO: 0.72 K/UL — LOW (ref 1.2–3.4)
LYMPHOCYTES # BLD AUTO: 5.3 % — LOW (ref 20.5–51.1)
MCHC RBC-ENTMCNC: 29.7 PG — SIGNIFICANT CHANGE UP (ref 27–31)
MCHC RBC-ENTMCNC: 34.4 G/DL — SIGNIFICANT CHANGE UP (ref 32–37)
MCV RBC AUTO: 86.3 FL — SIGNIFICANT CHANGE UP (ref 80–94)
MONOCYTES # BLD AUTO: 1.2 K/UL — HIGH (ref 0.1–0.6)
MONOCYTES NFR BLD AUTO: 8.8 % — SIGNIFICANT CHANGE UP (ref 1.7–9.3)
NEUTROPHILS # BLD AUTO: 11.73 K/UL — HIGH (ref 1.4–6.5)
NEUTROPHILS NFR BLD AUTO: 85.5 % — HIGH (ref 42.2–75.2)
NRBC # BLD: 0 /100 WBCS — SIGNIFICANT CHANGE UP (ref 0–0)
PLATELET # BLD AUTO: 130 K/UL — SIGNIFICANT CHANGE UP (ref 130–400)
PMV BLD: 11.5 FL — HIGH (ref 7.4–10.4)
POTASSIUM SERPL-MCNC: 4.8 MMOL/L — SIGNIFICANT CHANGE UP (ref 3.5–5)
POTASSIUM SERPL-SCNC: 4.8 MMOL/L — SIGNIFICANT CHANGE UP (ref 3.5–5)
PROT SERPL-MCNC: 6.9 G/DL — SIGNIFICANT CHANGE UP (ref 6–8)
RBC # BLD: 5.02 M/UL — SIGNIFICANT CHANGE UP (ref 4.7–6.1)
RBC # FLD: 12.9 % — SIGNIFICANT CHANGE UP (ref 11.5–14.5)
SODIUM SERPL-SCNC: 135 MMOL/L — SIGNIFICANT CHANGE UP (ref 135–146)
TRIGL SERPL-MCNC: 119 MG/DL — SIGNIFICANT CHANGE UP
TROPONIN T, HIGH SENSITIVITY RESULT: 13 NG/L — SIGNIFICANT CHANGE UP (ref 6–21)
TROPONIN T, HIGH SENSITIVITY RESULT: 15 NG/L — SIGNIFICANT CHANGE UP (ref 6–21)
WBC # BLD: 13.71 K/UL — HIGH (ref 4.8–10.8)
WBC # FLD AUTO: 13.71 K/UL — HIGH (ref 4.8–10.8)

## 2024-10-14 PROCEDURE — 85610 PROTHROMBIN TIME: CPT

## 2024-10-14 PROCEDURE — 99222 1ST HOSP IP/OBS MODERATE 55: CPT

## 2024-10-14 PROCEDURE — 85730 THROMBOPLASTIN TIME PARTIAL: CPT

## 2024-10-14 PROCEDURE — 76705 ECHO EXAM OF ABDOMEN: CPT | Mod: 26

## 2024-10-14 PROCEDURE — 85027 COMPLETE CBC AUTOMATED: CPT

## 2024-10-14 PROCEDURE — 83735 ASSAY OF MAGNESIUM: CPT

## 2024-10-14 PROCEDURE — 80061 LIPID PANEL: CPT

## 2024-10-14 PROCEDURE — 86850 RBC ANTIBODY SCREEN: CPT

## 2024-10-14 PROCEDURE — 86901 BLOOD TYPING SEROLOGIC RH(D): CPT

## 2024-10-14 PROCEDURE — 74177 CT ABD & PELVIS W/CONTRAST: CPT | Mod: 26,MC

## 2024-10-14 PROCEDURE — 85025 COMPLETE CBC W/AUTO DIFF WBC: CPT

## 2024-10-14 PROCEDURE — 94640 AIRWAY INHALATION TREATMENT: CPT

## 2024-10-14 PROCEDURE — 99285 EMERGENCY DEPT VISIT HI MDM: CPT | Mod: FS

## 2024-10-14 PROCEDURE — 80053 COMPREHEN METABOLIC PANEL: CPT

## 2024-10-14 PROCEDURE — 84100 ASSAY OF PHOSPHORUS: CPT

## 2024-10-14 PROCEDURE — 82248 BILIRUBIN DIRECT: CPT

## 2024-10-14 PROCEDURE — 83690 ASSAY OF LIPASE: CPT

## 2024-10-14 PROCEDURE — 86900 BLOOD TYPING SEROLOGIC ABO: CPT

## 2024-10-14 PROCEDURE — 36415 COLL VENOUS BLD VENIPUNCTURE: CPT

## 2024-10-14 PROCEDURE — 74183 MRI ABD W/O CNTR FLWD CNTR: CPT | Mod: MC

## 2024-10-14 PROCEDURE — A9579: CPT

## 2024-10-14 RX ORDER — ONDANSETRON HCL/PF 4 MG/2 ML
4 VIAL (ML) INJECTION ONCE
Refills: 0 | Status: DISCONTINUED | OUTPATIENT
Start: 2024-10-14 | End: 2024-10-17

## 2024-10-14 RX ORDER — INFLUENZA VIRUS VACCINE 15; 15; 15; 15 UG/.5ML; UG/.5ML; UG/.5ML; UG/.5ML
0.5 SUSPENSION INTRAMUSCULAR ONCE
Refills: 0 | Status: DISCONTINUED | OUTPATIENT
Start: 2024-10-14 | End: 2024-10-17

## 2024-10-14 RX ORDER — PIPERACILLIN SODIUM AND TAZOBACTAM SODIUM 12; 1.5 G/60ML; G/60ML
3.38 INJECTION, POWDER, LYOPHILIZED, FOR SOLUTION INTRAVENOUS ONCE
Refills: 0 | Status: COMPLETED | OUTPATIENT
Start: 2024-10-15 | End: 2024-10-15

## 2024-10-14 RX ORDER — PIPERACILLIN SODIUM AND TAZOBACTAM SODIUM 12; 1.5 G/60ML; G/60ML
3.38 INJECTION, POWDER, LYOPHILIZED, FOR SOLUTION INTRAVENOUS EVERY 8 HOURS
Refills: 0 | Status: DISCONTINUED | OUTPATIENT
Start: 2024-10-15 | End: 2024-10-15

## 2024-10-14 RX ORDER — SODIUM CHLORIDE IRRIG SOLUTION 0.9 %
1000 SOLUTION, IRRIGATION IRRIGATION ONCE
Refills: 0 | Status: COMPLETED | OUTPATIENT
Start: 2024-10-14 | End: 2024-10-14

## 2024-10-14 RX ORDER — PIPERACILLIN SODIUM AND TAZOBACTAM SODIUM 12; 1.5 G/60ML; G/60ML
3.38 INJECTION, POWDER, LYOPHILIZED, FOR SOLUTION INTRAVENOUS ONCE
Refills: 0 | Status: COMPLETED | OUTPATIENT
Start: 2024-10-14 | End: 2024-10-14

## 2024-10-14 RX ORDER — SODIUM CHLORIDE IRRIG SOLUTION 0.9 %
1000 SOLUTION, IRRIGATION IRRIGATION
Refills: 0 | Status: DISCONTINUED | OUTPATIENT
Start: 2024-10-14 | End: 2024-10-16

## 2024-10-14 RX ORDER — TEMAZEPAM 30 MG
15 CAPSULE ORAL AT BEDTIME
Refills: 0 | Status: DISCONTINUED | OUTPATIENT
Start: 2024-10-14 | End: 2024-10-17

## 2024-10-14 RX ORDER — ROSUVASTATIN CALCIUM 20 MG/1
10 TABLET, COATED ORAL AT BEDTIME
Refills: 0 | Status: DISCONTINUED | OUTPATIENT
Start: 2024-10-14 | End: 2024-10-15

## 2024-10-14 RX ORDER — CHLORHEXIDINE GLUCONATE ORAL RINSE 1.2 MG/ML
1 SOLUTION DENTAL
Refills: 0 | Status: DISCONTINUED | OUTPATIENT
Start: 2024-10-14 | End: 2024-10-17

## 2024-10-14 RX ORDER — MORPHINE SULFATE 30 MG/1
4 TABLET, FILM COATED, EXTENDED RELEASE ORAL ONCE
Refills: 0 | Status: DISCONTINUED | OUTPATIENT
Start: 2024-10-14 | End: 2024-10-14

## 2024-10-14 RX ADMIN — MORPHINE SULFATE 4 MILLIGRAM(S): 30 TABLET, FILM COATED, EXTENDED RELEASE ORAL at 13:19

## 2024-10-14 RX ADMIN — Medication 15 MILLIGRAM(S): at 22:02

## 2024-10-14 RX ADMIN — Medication 1000 MILLILITER(S): at 13:38

## 2024-10-14 RX ADMIN — ROSUVASTATIN CALCIUM 10 MILLIGRAM(S): 20 TABLET, COATED ORAL at 22:02

## 2024-10-14 RX ADMIN — PIPERACILLIN SODIUM AND TAZOBACTAM SODIUM 200 GRAM(S): 12; 1.5 INJECTION, POWDER, LYOPHILIZED, FOR SOLUTION INTRAVENOUS at 23:05

## 2024-10-14 RX ADMIN — Medication 250 MILLILITER(S): at 22:05

## 2024-10-14 NOTE — ED PROVIDER NOTE - CLINICAL SUMMARY MEDICAL DECISION MAKING FREE TEXT BOX
Pt here with LUQ/epigastric pain x1 wk which worsened last night, now with vomiting. Here in ED, HR 110s, rest of vitals reassuring. No peritonitic signs. Plan for labs, ekg, imaging r/o pancreatitis, choledocho, cholangitis, SBO, electrolyte derangement. Supportive care and reassess. Labs notable for WBC 13, lipase >1k c/w pancreatitis, Tbili 2.4 with half direct half indirect component, AST//300. CT abd and RUQ US negative for acute pathology, CBD appears to be normal for post cholecystectomy. Triglyceride level wnl.  GI consult placed but no answer so hospitalist to f/u GI recs. Pt requires admission for continued management of pancreatitis +/- ERCP vs MRCP given risk for worsening pancreatitis with complications, worsening LFT derangement, etc if not closely monitored and tx'ed.

## 2024-10-14 NOTE — ED PROVIDER NOTE - CARE PLAN
Principal Discharge DX:	Acute pancreatitis   1 Principal Discharge DX:	Acute pancreatitis  Secondary Diagnosis:	Elevated liver enzymes   Principal Discharge DX:	Acute pancreatitis  Secondary Diagnosis:	Elevated liver enzymes  Secondary Diagnosis:	Leukocytosis

## 2024-10-14 NOTE — H&P ADULT - NS ATTEND AMEND GEN_ALL_CORE FT
Seen while in the ER, old records reviewed Seen while in the ER, old records reviewed (includes history of robot assisted lap cholecystectomy last January) Seen while in the ER, old records reviewed (includes history of robot assisted lap cholecystectomy last January). As noted above, request to transfer to [facility] per GI recommendation has been made and both current MAR and overnight nocturnist have been informed

## 2024-10-14 NOTE — H&P ADULT - ASSESSMENT
Assessment:        Plan:      #   Sepsis not present on admission  Admit to inpatient level of care-med/surg  Pain control  Continue with IV hydration  Maintain NPO status   GI consulted by ER team- follow up recommendations  Trend wbc curve  CT abdomen and RUQ sonogram: No acute abnormality in the abdomen and pelvis.    #HTN      #HLD      VTE ppx:  GI ppx:   Diet: NPO  CHG ordered.  Ambulate as tolerated.           Above discussed with Dr. Ugalde  Assessment:  Patient is a 75 y/o M with a pmhx of HLD who presents complaining of persistent left sided abdominal pain radiating to the epigastrium region for the past 1 week.      Plan:      # Diffuse abdominal pain with nausea, vomiting   Sepsis not present on admission  Admit to inpatient level of care-med/surg  Pain control  Continue with IV hydration  Maintain NPO status   GI consulted by ER team- follow up recommendations  Trend wbc curve  CT abdomen and RUQ sonogram: No acute abnormality in the abdomen and pelvis.      #HLD  Continue with statin.     VTE ppx:  GI ppx:   Diet: NPO  CHG ordered.  Ambulate as tolerated.           Above discussed with Dr. Ugalde  Assessment:  Patient is a 75 y/o M with a pmhx of HLD, s/p cholecystectomy with Dr. Baxter in February 2024,  who presents complaining of persistent left sided abdominal pain radiating to the epigastrium region for the past 1 week.    Plan:      # Diffuse abdominal pain with nausea, vomiting r/o pancreatitis   # Elevated Lipase and LFTs  # Hx of cholestectomy in February 2024 with Dr. Baxter   Sepsis not present on admission  Admit to inpatient level of care-med/surg  Pain control  Continue with IV hydration  Maintain NPO status   Follow up AM labs  GI consulted and recommendations appreciated.  Case discussed with Dr. Mukherjee (GI fellow) who recommends transfer to [facility] for ERCP vs. MRCP  Trend WBC curve  CT abdomen and RUQ sonogram: No acute abnormality in the abdomen and pelvis.      #HLD  Continue with statin.       VTE ppx: Heparin  GI ppx: not indicated.   Diet: NPO  CHG ordered.  Ambulate as tolerated.           Above discussed with Dr. Ugalde

## 2024-10-14 NOTE — ED PROVIDER NOTE - ATTENDING APP SHARED VISIT CONTRIBUTION OF CARE
75 yo M with hx of HLD, hiatal hernia, cholecystectomy (1/19/24), duodenitis who presents with LUQ pain radiating to epigastric area x1 wk which worsened last night. Had nbnb vomiting last night and pain worsened so called his surgeon who advised him to come to ED. No fever, cp, sob, diarrhea, blood in urine/stool. No heavy etoh use.    PMD Dr. Adams  Gen surg Dr. Baxter    CONSTITUTIONAL: well developed, nontoxic appearing, in no acute distress, speaking in full sentences  SKIN: warm, dry, no rash, cap refill < 2 seconds  HEENT: normocephalic, atraumatic, no conjunctival erythema, moist mucous membranes, patent airway  NECK: supple  CV:  tachycardic rate, regular rhythm, 2+ radial pulses bilaterally  RESP: no wheezes, no rales, no rhonchi, normal work of breathing  ABD: soft, epigastric/LUQ tenderness, nondistended, no rebound, no guarding  MSK: normal ROM, no cyanosis, no edema  NEURO: alert, oriented, grossly unremarkable  PSYCH: cooperative, appropriate    A&P:  Pt here with LUQ/epigastric pain x1 wk which worsened last night, now with vomiting. Here in ED, HR 110s, rest of vitals reassuring. No peritonitic signs. Plan for labs, ekg, imaging r/o pancreatitis, choledocho, cholangitis, SBO, electrolyte derangement. Supportive care and reassess.

## 2024-10-14 NOTE — ED PROVIDER NOTE - PHYSICAL EXAMINATION
VITAL SIGNS: I have reviewed nursing notes and confirm.  CONSTITUTIONAL: Well-developed; well-nourished; in no acute distress.   SKIN: skin exam is warm and dry, no acute rash.    HEAD: Normocephalic; atraumatic.  EYES: conjunctiva and sclera clear.  ENT: No nasal discharge; airway clear.  CARD: S1, S2 normal; no murmurs, gallops, or rubs. Regular rate and rhythm.   RESP: No wheezes, rales or rhonchi.  ABD: diffuse ttp Normal bowel sounds; soft; non-distended  EXT: Normal ROM.  No clubbing, cyanosis or edema.   NEURO: Alert, oriented, grossly unremarkable

## 2024-10-14 NOTE — ED PROVIDER NOTE - NSICDXPASTMEDICALHX_GEN_ALL_CORE_FT
Patient pacing in the room, knocking on the door frequently for nurses attention. States he does not feel he needs the IVF, wondering how he can turn them off when he feels its enough   PAST MEDICAL HISTORY:  Duodenitis     Hiatal hernia     HLD (hyperlipidemia)

## 2024-10-14 NOTE — H&P ADULT - HISTORY OF PRESENT ILLNESS
Patient is a 73 y/o M with a pmhx of HLD who presents complaining of persistent left sided abdominal pain radiating to the epigastrium region for the past 1 week. Describes the pain as "stabbing" rating it a 10/10 in intensity. Associated with multiple episodes of NBNB vomiting, nausea and chills. (-) fever. States the pain started suddenly several hours after eating a sandwich. Last bowel movement this morning. States he had a cholecystectomy in February 2024 with Dr. Baxter, doing well since then. States pain is similar to gall stones. Otherwise patient denies fever, chest pain, diarrhea, constipation,  Patient is a 73 y/o M with a pmhx of HLD, s/p cholecystectomy with Dr. Baxter in February 2024,  who presents complaining of persistent left sided abdominal pain radiating to the epigastrium region for the past 1 week. Describes the pain as "stabbing" rating it a 10/10 in intensity. Associated with multiple episodes of NBNB vomiting, nausea and chills. (-) fever. States the pain started suddenly several hours after eating soup. Last bowel movement this morning. States he's been doing well after the cholecystomy. States the pain is similar to the gall stones. Otherwise patient denies fever, chest pain, diarrhea, constipation,

## 2024-10-14 NOTE — PATIENT PROFILE ADULT - FUNCTIONAL ASSESSMENT - BASIC MOBILITY 3.
No new neurological events overnight. Patient reports h/o renal disease in mother, causing her death in her 20s.  Stable neurological exam, reconstructed digits present in both hands.  EEG only showed GBS, no seizure tendency      Assessment:  Syncopal event      Recs:  - No further neurodiagnostic testing indicated  - Plentiful fluid hydration encouraged  - Caution with position changes  - Cardiology follow-up given finding of left atrial dilation      NOTE TO BE COMPLETED - PLEASE REFER TO ABOVE ONLY AND IGNORE INFORMATION BELOW    Neurology Follow up note    Name  BAILEY WAGGONER    HPI:  80 years old female from home, lives with her , with PMHx of HTN, DM, hyperlipidemia presenting s/p fall per ems, patient found on ground, unable to get up. Patient endorses she is unsure how she end up on the ground or at what time it happened. Patient was confused after the episode per .   Patient denies headache, nausea, vomiting, chest pain, shortness of breath. Patient also endorses knee pain on left side.  Patient is admitted for syncope. EKG is negative for ischemia. Orthostatic vitals is negative. Patient is being worked up for syncope. (31 Jan 2020 00:30)      Interval History -        Subjective:        MEDICATIONS  (STANDING):  aspirin  chewable 81 milliGRAM(s) Oral daily  atorvastatin 40 milliGRAM(s) Oral at bedtime  cyanocobalamin Injectable 1000 MICROGram(s) IntraMuscular daily  enoxaparin Injectable 40 milliGRAM(s) SubCutaneous daily  ergocalciferol 80497 Unit(s) Oral <User Schedule>  insulin glargine Injectable (LANTUS) 30 Unit(s) SubCutaneous every morning  insulin lispro (HumaLOG) corrective regimen sliding scale   SubCutaneous three times a day before meals  insulin lispro Injectable (HumaLOG) 8 Unit(s) SubCutaneous three times a day before meals  losartan 25 milliGRAM(s) Oral two times a day  metoprolol tartrate 25 milliGRAM(s) Oral two times a day    MEDICATIONS  (PRN):      Allergies    penicillin (Unknown)    Intolerances        Review of Systems:  General: [ ] None, [ ] chills, [ ]fatigue, [ ] fevers  Skin: [ ] None, [ ] rash   HEENT: [ ] None, [ ] head injury, [ ] blurred vision, [ ] double vision, [ ] eye pain, [ ] visual loss, [ ] hearing loss, [ ] deafness, [ ] ear pain, [ ] ringing in the ears, [ ] vertigo, [ ] sinus pain, [ ] voice changes  Neck: [ ] None, [ ] neck stiffness  Respiratory: [ ] None, [ ] cough, [ ] difficulty breathing  Cardiovascular: [ ] None, [ ] calf cramps, [ ] chest pain, [ ] leg pain, [ ] swelling, [ ] rapid heart rate, [ ] shortness of breath  Gastrointestinal: [ ] None, [ ] abdominal pain, [ ] nausea, [ ] vomiting  Musculoskeletal: [ ] None, [ ] back pain, [ ] joint pain, [ ] joint stiffness, [ ] leg cramps, [ ] muscle atrophy, [ ] muscle cramps, [ ] muscle weakness, [ ] swelling of extremities  Neurological: [ ] None, [ ] Dizziness, [ ] decreased memory, [ ] fainting, [ ] focal neurological symptoms, [ ] headaches, [ ] incontinence of stool, [ ] incontinence of urine, [ ] loss of consciousness, [ ] numbness, [ ] seizures, [ ] spinning sensation, [ ] stroke, [ ] trouble walking, [ ] unsteadiness, [ ] visual changes, [ ] weakness  Psychiatric: [ ] None,  [ ] depression, [ ] anxiety, [ ] hallucinations, [ ] inability to concentrate, [ ] mood changes, [ ] panic attacks  Hematology: [ ] None,  [ ] blood clots, [ ] spontaneous bleeding      Objective:   Vital Signs Last 24 Hrs  T(C): 36.5 (03 Feb 2020 19:13), Max: 36.8 (03 Feb 2020 04:45)  T(F): 97.7 (03 Feb 2020 19:13), Max: 98.3 (03 Feb 2020 04:45)  HR: 63 (03 Feb 2020 19:13) (58 - 74)  BP: 115/78 (03 Feb 2020 19:13) (110/55 - 153/65)  BP(mean): --  RR: 18 (03 Feb 2020 19:13) (18 - 18)  SpO2: 95% (03 Feb 2020 19:13) (95% - 97%)    General Exam:   General appearance: No acute distress                 Cardiovascular: Pedal dorsalis pulses intact bilaterally    Neurological Exam:  Mental Status: Orientated to self, date and place.  Attention intact.  No dysarthria, aphasia or neglect.  Knowledge intact.  Registration intact.  Short and long term memory grossly intact.      Cranial Nerves: CN I - not tested.  PERRL, EOMI, VFF, no nystagmus or diplopia.  No APD.  Fundi not visualized bilaterally.  CN V1-3 intact to light touch and pinprick.  No facial asymmetry.  Hearing intact to finger rub bilaterally.  Tongue, uvula and palate midline.  Sternocleidomastoid and Trapezius intact bilaterally.    Motor:   Tone: normal.                  Strength: intact throughout  Pronator drift: none                 Dysmeria: None to finger-nose-finger or heel-shin-heel  No truncal ataxia.    Tremor: No resting, postural or action tremor.  No myoclonus.    Sensation: intact to light touch, pinprick, vibration and proprioception    Deep Tendon Reflexes: 1+ bilateral biceps, triceps, brachioradialis, knee and ankle  Toes flexor bilaterally    Gait: normal and stable.      Other:    02-03    141  |  104  |  16  ----------------------------<  175<H>  3.8   |  30  |  0.65    Ca    9.0      03 Feb 2020 06:33  Phos  3.4     02-03  Mg     2.0     02-03    TPro  7.2  /  Alb  3.1<L>  /  TBili  0.6  /  DBili  x   /  AST  15  /  ALT  20  /  AlkPhos  70  02-03 02-03    141  |  104  |  16  ----------------------------<  175<H>  3.8   |  30  |  0.65    Ca    9.0      03 Feb 2020 06:33  Phos  3.4     02-03  Mg     2.0     02-03    TPro  7.2  /  Alb  3.1<L>  /  TBili  0.6  /  DBili  x   /  AST  15  /  ALT  20  /  AlkPhos  70  02-03    LIVER FUNCTIONS - ( 03 Feb 2020 06:33 )  Alb: 3.1 g/dL / Pro: 7.2 g/dL / ALK PHOS: 70 U/L / ALT: 20 U/L DA / AST: 15 U/L / GGT: x             Radiology    EKG:  tele:  TTE:  EEG:                 Please contact the Neurology consult service with any questions.    Frank Mcknight MD   of Neurology  [facility] at [facility] Neurology Follow up note    Name  BAILEY WAGGONER    HPI:  80 years old female from home, lives with her , with PMHx of HTN, DM, hyperlipidemia presenting s/p fall per ems, patient found on ground, unable to get up. Patient endorses she is unsure how she end up on the ground or at what time it happened. Patient was confused after the episode per .   Patient denies headache, nausea, vomiting, chest pain, shortness of breath. Patient also endorses knee pain on left side.  Patient is admitted for syncope. EKG is negative for ischemia. Orthostatic vitals is negative. Patient is being worked up for syncope. (31 Jan 2020 00:30)    Interval History -  No new neurological events overnight. Patient reports h/o renal disease in mother, causing her death in her 20s.    MEDICATIONS  (STANDING):  aspirin  chewable 81 milliGRAM(s) Oral daily  atorvastatin 40 milliGRAM(s) Oral at bedtime  cyanocobalamin Injectable 1000 MICROGram(s) IntraMuscular daily  enoxaparin Injectable 40 milliGRAM(s) SubCutaneous daily  ergocalciferol 34688 Unit(s) Oral <User Schedule>  insulin glargine Injectable (LANTUS) 30 Unit(s) SubCutaneous every morning  insulin lispro (HumaLOG) corrective regimen sliding scale   SubCutaneous three times a day before meals  insulin lispro Injectable (HumaLOG) 8 Unit(s) SubCutaneous three times a day before meals  losartan 25 milliGRAM(s) Oral two times a day  metoprolol tartrate 25 milliGRAM(s) Oral two times a day    Allergies  Penicillin (Unknown)    Review of Systems: Fourteen systems reviewed and negative except as in HPI / Interval History.      Objective:   Vital Signs Last 24 Hrs  T(C): 36.5 (03 Feb 2020 19:13), Max: 36.8 (03 Feb 2020 04:45)  T(F): 97.7 (03 Feb 2020 19:13), Max: 98.3 (03 Feb 2020 04:45)  HR: 63 (03 Feb 2020 19:13) (58 - 74)  BP: 115/78 (03 Feb 2020 19:13) (110/55 - 153/65)  RR: 18 (03 Feb 2020 19:13) (18 - 18)  SpO2: 95% (03 Feb 2020 19:13) (95% - 97%)    General Exam:  General: No acute distress  Respiratory: CTAB/l.  No crackles, rhonchi, or wheezes.  Cardiovascular: RRR, No murmurs, Full b/l radial and pedal pulses  Musculoskeletal: Reconstructed digits present in both hands    Neurological Exam:  General / Mental Status: Oriented to person, place, and time.  No dysarthria or aphasia present.  Naming and repetition intact.  Cranial Nerves: PERRLA, EOMI x 2, VFF x 4, No nystagmus or diplopia.  B/l V1-V3 equal to light touch and pinprick.  Symmetric facial movement and palate elevation.  B/l hearing equal to finger rub.  Negative Dee-Hallpike maneuver b/l.  5/5 strength with b/l sternocleidomastoid and trapezius.  Midline tongue protrusion with no atrophy or fasciculations.  Motor: Normal bulk and tone in all four extremities.  5/5 strength throughout all four extremities.  No downward drift, rigidity, spasticity, or tremors in any of the four extremities.  Sensation: Intact to light touch and pinprick in all four extremities.  Coordination: No dysmetria with b/l finger-to-nose and heel raise tests.  Reflexes: 2+ and symmetric at b/l biceps, triceps, brachioradialis, patellae, and ankles.  Toes flexor b/l.  Gait and Romberg testing deferred per patient request.      Labs:    02-03    141  |  104  |  16  ----------------------------<  175<H>  3.8   |  30  |  0.65    Ca    9.0      03 Feb 2020 06:33  Phos  3.4     02-03  Mg     2.0     02-03    TPro  7.2  /  Alb  3.1<L>  /  TBili  0.6  /  DBili  x   /  AST  15  /  ALT  20  /  AlkPhos  70  02-03      Neuroimaging:    CT Head (1/30/20):  - No acute intracranial abnormality  - Chronic microvascular disease  - Diffuse atrophy  - Cavum septum pellucidum et vergae (normal variant)     EEG (2/3/20):  - Bitemporal slowing (left > right)  - Generalized background slowing  - No seizure tendency    TTE with Doppler (1/31/20):  - LVEF 55-60%  - Severely dilated left atrium  - Grade I diastolic dysfunction  - Trace tricuspid regurgitation  - Mild mitral and pulmonic regurgitation  - No cardiac thrombus or intracardiac shunt identified      Assessment:  80 LHF with syncopal event in the setting of abnormal cardiac findings on echocardiogram, unlikely to be primarily neurological      Recommendations:    - No further neurodiagnostic testing indicated    - Plentiful fluid hydration encouraged    - Caution with position changes    - Continue telemetry while inpatient    - Cardiology follow-up given finding of left atrial dilation, which may be associated with atrial fibrillation      Please contact the Neurology consult service with any questions.    Frank Mcknight MD   of Neurology  [facility] [employer] [facility] 4 = No assist / stand by assistance

## 2024-10-14 NOTE — PATIENT PROFILE ADULT - FALL HARM RISK - UNIVERSAL INTERVENTIONS
Bed in lowest position, wheels locked, appropriate side rails in place/Call bell, personal items and telephone in reach/Instruct patient to call for assistance before getting out of bed or chair/Non-slip footwear when patient is out of bed/[place] to call system/Physically safe environment - no spills, clutter or unnecessary equipment/Purposeful Proactive Rounding/Room/bathroom lighting operational, light cord in reach

## 2024-10-14 NOTE — ED ADULT TRIAGE NOTE - BP NONINVASIVE DIASTOLIC (MM HG)
Physical Therapy    Visit Type: initial evaluation  Precautions:  Medical precautions:  fall risk; standard precautions.    Lines:     Basic: O2, capped IV and telemetry (cpap - O2 5 L with activity; 3 L at rest)    Complex: Cpap      Lines in chart and on patient reviewed, precautions maintained throughout session.  Safety Measures: bed alarm  SUBJECTIVE  Patient agreed to participate in therapy this date.  Patient verbally agrees to allow the following to be present during session: son    Patient is a 71 year old male admitted to [facility] for CHF and hypoxemia. PMH of CAD, CABG, NSTEMI, Aortic stenosis, CHF (EF 40-45%), HTN, hyperlipidemia, DM II, hypothyroidism, TOO, COPD. Recent discharge from OSH for CHF and volume overload, discharged on 3/4/23, however unable to enter his home, requiring EMS. Pt lives with his son in a one level home.  At baseline, patient is Modified Independent with ADLs and Modified Independent with functional mobility tasks using wheeled walker.    Patient / Family Goal: return to previous functional status, maximize function and return home    Pain   RN informed on pain level     OBJECTIVE     Cognitive Status   Level of Consciousness   - alert  Arousal Alertness   - appropriate responses to stimuli  Affect/Behavior    - cooperative  Orientation    - Oriented to: person, place, time and situation  Functional Communication   - Overall Status: within functional limits   - Forms of Communication: verbal  Attention Span    - Attention: intact  Following Direction   - follows all commands and directions consistently  Transition Between Tasks   - transitions without difficulty  Memory   - intact  Safety Awareness/Insight   - intact  Awareness of Deficits   - fully aware of deficits    Vitals:  spO2 85%-94%    Patient Activity Tolerance: 1 to 2 activity to rest    Respiratory: increased respiration rate    Skin:   Significant pitting edema in trunk, pelvis, scrotum, luci LE; scattered pressure areas  - RN aware      Strength  (out of 5 unless noted, standard test position unless noted)   Comments / Details: Grossly 2-/5, limited by edema, pain, generalized weakness      Sitting Balance  (REY = base of support)  Static      - Trial 1 details: stand by assist, total assist, with back unsupported, with back support and with double UE support  Poor sitting tolerance; sits SBA for 1:00 then requires total assist due to truncal fatigue and dyspnea       Bed Mobility  - Rolling left: maximal assist  - Rolling right: maximal assist  - Repositioning in bed: total assist - dependent   - Supine to sit: total assist - non-dependent, 2 persons  - Sit to supine: total assist - non-dependent, 2 persons  Max assist x 2 at trunk and luci LE with HEAD OF BED elevated for supine-sit. Patient unable to scoot anteriorly to position feet on floor. Assist of 3 to bring patient into bed and use of hover mat for repositioning in bed.    Interventions     Training provided: bed mobility training, safety training, breathing/relaxation and caregiver training    Skilled input: Verbal instruction/cues and tactile instruction/cues  Verbal Consent: Writer verbally educated and received verbal consent for hand placement, positioning of patient, and techniques to be performed today from patient          ASSESSMENT     Visit # since seen by PT:  0    Patient presents to physical therapy significantly below baseline functional mobility level of mod indep.  Patient is demonstrating decreased range of motion, decreased strength, balance deficits, pain, decreased activity tolerance, postural problems, swelling, decreased endurance, shortness of breath which is limiting the completion of all functional mobility at this time.  Patient presents with significant edema throughout trunk and luci LE, on top of profound luci LE and truncal weakness, resulting in need for total assist with all mobility. Patient also lacks sufficient activity tolerance for  standing trials or out of bed to chair, with dyspnea and inability to maintain unsupported sitting > 1:00. Given patient's deconditioning and current level of assist, as well as skin integrity, concerns escalated to RN that patient would benefit from ceiling lift room and use of such equipment for repositioning, rolling, and out of bed to chair once endurance improves. Further skilled physical therapy is required to address these limitations in attempt to maximize the patient's independence.            Discharge Recommendations  Recommendation for Discharge Location: PT WI: Post-acute facility with therapy needs  Recommendation for Discharge Support: PT WI: 24 Hour assist  PT/OT Mobility Equipment for Discharge: TBD at next level of care  PT/OT ADL Equipment for Discharge: TBD at next level of care      • Skilled therapy is required to address these limitations in attempt to maximize the patient's independence.     • Predicted patient presentation: Moderate (evolving) - Patient comorbidities and complexities, as defined above, may have varying impact on steady progress for prescribed plan of care.    Education:   - Present and ready to learn: patient and patient's family  Education provided during session:  - Results of above outlined education: Verbalizes understanding and Needs reinforcement    Patient at End of Session:   Location: in bed  Safety measures: alarm system in place/re-engaged, lines intact, bed rails x3, call light within reach and equipment intact  Handoff to: nurse, therapist and family/caregiver    PLAN   Suggestions for next session as indicated: Generalized LE strengthening, bed mobility and sitting tolerance edge of bed, progression twoard standing with use of mechanical lift    PT Frequency: 3-5 x per week      Interventions: balance, bed mobility, energy conservation, gait training, neuromuscular re-education, ROM, strengthening, safety education, patient/family training, equipment  eval/education, HEP train/position, endurance training and functional transfer training  Agreement to plan and goals: patient agrees with goals and treatment plan and family/significant other/caregiver agrees        GOALS  Review Date: 3/13/2023  Long Term Goals: (to be met by time of discharge from hospital)  Rolling left: Patient will complete bed mobility for rolling left moderate assist.  Rolling right: Patient will complete bed mobility for rolling right moderate assist.  Sit to supine: Patient will complete sit to supine moderate assist.  Supine to sit: Patient will complete supine to sit moderate assist.  Sit (edge of bed): Patient will sit at edge of bed for 5:00, minimal assist.   Sit to stand: Patient will complete sit to stand transfer with mechanical sit to stand lift, total assist.   Stand to sit: Patient will complete stand to sit transfer with mechanical sit to stand lift, total assist.   Stand pivot: Patient will complete stand pivot transfer with mechanical sit to stand lift, total assist.           Therapy procedure time and total treatment time can be found documented on the Time Entry flowsheet   84

## 2024-10-14 NOTE — ED PROVIDER NOTE - PATIENT PORTAL LINK FT
You can access the FollowMyHealth Patient Portal offered by [facility] by registering at the following website: [email or web address]. By joining [employer]’s FollowMyHealth portal, you will also be able to view your health information using other applications (apps) compatible with our system.

## 2024-10-14 NOTE — ED ADULT NURSE NOTE - EXTENSIONS OF SELF_ADULT
Addended by: LUANA CLEMONS on: 11/1/2017 09:00 AM     Modules accepted: Orders     See [employer] messages below. Patient was contacted today and still is reporting symptoms described in MyChart message. Once again, did deny any self harm or suicidal ideation. Dr. Buchanan did suggest addition of Wellbutrin in message but has not yet prescribed this    Due to absence of PCP, routing to a covering provider to address if comfortable with prescribing or if they wish for PCP to address    Corinne R Thayer, RN on 5/25/2023 at 3:28 PM     None

## 2024-10-14 NOTE — H&P ADULT - NSHPLABSRESULTS_GEN_ALL_CORE
LABS:                        14.9   13.71 )-----------( 130      ( 14 Oct 2024 12:30 )             43.3     10-14    135  |  97[L]  |  16  ----------------------------<  112[H]  4.8   |  24  |  1.4    Ca    9.1      14 Oct 2024 12:30    TPro  6.9  /  Alb  4.2  /  TBili  2.4[H]  /  DBili  1.2[H]  /  AST  114[H]  /  ALT  300[H]  /  AlkPhos  247[H]  10-14    LIVER FUNCTIONS - ( 14 Oct 2024 12:30 )  Alb: 4.2 g/dL / Pro: 6.9 g/dL / ALK PHOS: 247 U/L / ALT: 300 U/L / AST: 114 U/L / GGT: x             Urinalysis Basic - ( 14 Oct 2024 12:30 )    Color: x / Appearance: x / SG: x / pH: x  Gluc: 112 mg/dL / Ketone: x  / Bili: x / Urobili: x   Blood: x / Protein: x / Nitrite: x   Leuk Esterase: x / RBC: x / WBC x   Sq Epi: x / Non Sq Epi: x / Bacteria: x  +++++++++++++++++++++++++++++++++++++++++++++++++++++++++++++++++++++++++++++++++++++++++++++++++++++  < from: US Abdomen Upper Quadrant Right (10.14.24 @ 16:30) >    IMPRESSION:  Status post cholecystectomy. No acute abnormality in the right upper   quadrant.    --- End of Report ---    VELVET RAMIREZ MD; Attending Radiologist  This document has been electronically signed. Oct 14 2024  5:43PM    < end of copied text >  ++++++++++++++++++++++++++++++++++++++++++++++++++++++++++++++++++++++++++++++++++++++++++++++++++++++  < from: CT Abdomen and Pelvis w/ IV Cont (10.14.24 @ 14:30) >    IMPRESSION:  No acute abnormality in the abdomen and pelvis.    --- End of Report ---    VELVET RAMIREZ MD; Attending Radiologist  This document has been electronically signed. Oct 14 2024  3:29PM    < end of copied text >

## 2024-10-14 NOTE — ED PROVIDER NOTE - OBJECTIVE STATEMENT
Patient is a 74-year-old male history of hypertension, cholecystectomy this past February here for evaluation of diffuse abdominal discomfort with associated nausea 1 episode of vomiting over the past 4 days.  Patient denies dysuria, hematuria, diarrhea

## 2024-10-14 NOTE — H&P ADULT - NSHPPHYSICALEXAM_GEN_ALL_CORE
Vital Signs Last 24 Hrs  T(C): 36.5 (14 Oct 2024 12:00), Max: 36.5 (14 Oct 2024 12:00)  T(F): 97.7 (14 Oct 2024 12:00), Max: 97.7 (14 Oct 2024 12:00)  HR: 83 (14 Oct 2024 18:45) (83 - 114)  BP: 109/67 (14 Oct 2024 18:45) (109/67 - 129/84)  RR: 16 (14 Oct 2024 18:45) (16 - 16)  SpO2: 99% (14 Oct 2024 18:45) (99% - 99%)    Parameters below as of 14 Oct 2024 18:45  Patient On (Oxygen Delivery Method): room air    VITALS:     GENERAL: NAD, lying in bed comfortably  HEAD:  Atraumatic, Normocephalic  EYES: EOMI, PERRLA, conjunctiva and sclera clear  ENT: Moist mucous membranes  NECK: Supple, No JVD  CHEST/LUNG: Clear to auscultation bilaterally; No rales, rhonchi, wheezing, or rubs. Unlabored respirations  HEART: Regular rate and rhythm; No murmurs, rubs, or gallops  ABDOMEN: Bowel sounds present; Soft, Nontender, Nondistended. No hepatomegally  EXTREMITIES:  2+ Peripheral Pulses, brisk capillary refill. No clubbing, cyanosis, or edema  NERVOUS SYSTEM:  Alert & Oriented X3, speech clear. No deficits   MSK: FROM all 4 extremities, full and equal strength  SKIN: No rashes or lesions Vital Signs Last 24 Hrs  T(C): 36.5 (14 Oct 2024 12:00), Max: 36.5 (14 Oct 2024 12:00)  T(F): 97.7 (14 Oct 2024 12:00), Max: 97.7 (14 Oct 2024 12:00)  HR: 83 (14 Oct 2024 18:45) (83 - 114)  BP: 109/67 (14 Oct 2024 18:45) (109/67 - 129/84)  RR: 16 (14 Oct 2024 18:45) (16 - 16)  SpO2: 99% (14 Oct 2024 18:45) (99% - 99%)    Parameters below as of 14 Oct 2024 18:45  Patient On (Oxygen Delivery Method): room air    VITALS:     GENERAL: NAD, lying in bed comfortably  HEAD:  Atraumatic, Normocephalic  EYES: EOMI, PERRLA, conjunctiva and sclera clear  ENT: Moist mucous membranes  NECK: Supple, No JVD  CHEST/LUNG: Clear to auscultation bilaterally; No rales, rhonchi, wheezing, or rubs. Unlabored respirations  HEART: Regular rate and rhythm; No murmurs, rubs, or gallops  ABDOMEN: Bowel sounds present; Soft, (+) diffuse epigastric tenderness   EXTREMITIES:  2+ Peripheral Pulses, brisk capillary refill. No clubbing, cyanosis, or edema  NERVOUS SYSTEM:  Alert & Oriented X3, speech clear. No deficits   MSK: FROM all 4 extremities, full and equal strength  SKIN: No rashes or lesions

## 2024-10-15 LAB
ALBUMIN SERPL ELPH-MCNC: 3.4 G/DL — LOW (ref 3.5–5.2)
ALP SERPL-CCNC: 184 U/L — HIGH (ref 30–115)
ALT FLD-CCNC: 178 U/L — HIGH (ref 0–41)
ANION GAP SERPL CALC-SCNC: 11 MMOL/L — SIGNIFICANT CHANGE UP (ref 7–14)
APTT BLD: 33.9 SEC — SIGNIFICANT CHANGE UP (ref 27–39.2)
AST SERPL-CCNC: 53 U/L — HIGH (ref 0–41)
BILIRUB SERPL-MCNC: 1.5 MG/DL — HIGH (ref 0.2–1.2)
BUN SERPL-MCNC: 16 MG/DL — SIGNIFICANT CHANGE UP (ref 10–20)
CALCIUM SERPL-MCNC: 8.6 MG/DL — SIGNIFICANT CHANGE UP (ref 8.4–10.5)
CHLORIDE SERPL-SCNC: 102 MMOL/L — SIGNIFICANT CHANGE UP (ref 98–110)
CHOLEST SERPL-MCNC: 115 MG/DL — SIGNIFICANT CHANGE UP
CO2 SERPL-SCNC: 27 MMOL/L — SIGNIFICANT CHANGE UP (ref 17–32)
CREAT SERPL-MCNC: 1.1 MG/DL — SIGNIFICANT CHANGE UP (ref 0.7–1.5)
EGFR: 71 ML/MIN/1.73M2 — SIGNIFICANT CHANGE UP
GLUCOSE SERPL-MCNC: 88 MG/DL — SIGNIFICANT CHANGE UP (ref 70–99)
HCT VFR BLD CALC: 38.3 % — LOW (ref 42–52)
HDLC SERPL-MCNC: 27 MG/DL — LOW
HGB BLD-MCNC: 12.8 G/DL — LOW (ref 14–18)
INR BLD: 1.25 RATIO — SIGNIFICANT CHANGE UP (ref 0.65–1.3)
LIDOCAIN IGE QN: 1221 U/L — HIGH (ref 7–60)
LIPID PNL WITH DIRECT LDL SERPL: 61 MG/DL — SIGNIFICANT CHANGE UP
MAGNESIUM SERPL-MCNC: 2.2 MG/DL — SIGNIFICANT CHANGE UP (ref 1.8–2.4)
MCHC RBC-ENTMCNC: 29.4 PG — SIGNIFICANT CHANGE UP (ref 27–31)
MCHC RBC-ENTMCNC: 33.4 G/DL — SIGNIFICANT CHANGE UP (ref 32–37)
MCV RBC AUTO: 88 FL — SIGNIFICANT CHANGE UP (ref 80–94)
NON HDL CHOLESTEROL: 88 MG/DL — SIGNIFICANT CHANGE UP
NRBC # BLD: 0 /100 WBCS — SIGNIFICANT CHANGE UP (ref 0–0)
PHOSPHATE SERPL-MCNC: 3 MG/DL — SIGNIFICANT CHANGE UP (ref 2.1–4.9)
PLATELET # BLD AUTO: 126 K/UL — LOW (ref 130–400)
PMV BLD: 11.9 FL — HIGH (ref 7.4–10.4)
POTASSIUM SERPL-MCNC: 4.2 MMOL/L — SIGNIFICANT CHANGE UP (ref 3.5–5)
POTASSIUM SERPL-SCNC: 4.2 MMOL/L — SIGNIFICANT CHANGE UP (ref 3.5–5)
PROT SERPL-MCNC: 5.8 G/DL — LOW (ref 6–8)
PROTHROM AB SERPL-ACNC: 14.3 SEC — HIGH (ref 9.95–12.87)
RBC # BLD: 4.35 M/UL — LOW (ref 4.7–6.1)
RBC # FLD: 12.7 % — SIGNIFICANT CHANGE UP (ref 11.5–14.5)
SODIUM SERPL-SCNC: 140 MMOL/L — SIGNIFICANT CHANGE UP (ref 135–146)
TRIGL SERPL-MCNC: 133 MG/DL — SIGNIFICANT CHANGE UP
WBC # BLD: 8.62 K/UL — SIGNIFICANT CHANGE UP (ref 4.8–10.8)
WBC # FLD AUTO: 8.62 K/UL — SIGNIFICANT CHANGE UP (ref 4.8–10.8)

## 2024-10-15 PROCEDURE — 99223 1ST HOSP IP/OBS HIGH 75: CPT

## 2024-10-15 PROCEDURE — 74183 MRI ABD W/O CNTR FLWD CNTR: CPT | Mod: 26

## 2024-10-15 PROCEDURE — 99232 SBSQ HOSP IP/OBS MODERATE 35: CPT

## 2024-10-15 PROCEDURE — 99024 POSTOP FOLLOW-UP VISIT: CPT

## 2024-10-15 RX ORDER — FLUTICASONE PROPIONATE 50 UG/1
1 SPRAY, METERED NASAL
Refills: 0 | Status: DISCONTINUED | OUTPATIENT
Start: 2024-10-15 | End: 2024-10-17

## 2024-10-15 RX ADMIN — Medication 400 MILLIGRAM(S): at 16:35

## 2024-10-15 RX ADMIN — Medication 400 MILLIGRAM(S): at 16:05

## 2024-10-15 RX ADMIN — PIPERACILLIN SODIUM AND TAZOBACTAM SODIUM 25 GRAM(S): 12; 1.5 INJECTION, POWDER, LYOPHILIZED, FOR SOLUTION INTRAVENOUS at 08:34

## 2024-10-15 RX ADMIN — Medication 15 MILLIGRAM(S): at 23:37

## 2024-10-15 RX ADMIN — PIPERACILLIN SODIUM AND TAZOBACTAM SODIUM 25 GRAM(S): 12; 1.5 INJECTION, POWDER, LYOPHILIZED, FOR SOLUTION INTRAVENOUS at 02:00

## 2024-10-15 RX ADMIN — FLUTICASONE PROPIONATE 1 SPRAY(S): 50 SPRAY, METERED NASAL at 17:46

## 2024-10-15 NOTE — PROGRESS NOTE ADULT - SUBJECTIVE AND OBJECTIVE BOX
MARTINEZ DOBBS 74y Male  MRN#: [id number]   CODE STATUS:________      SUBJECTIVE  Patient is a 74y old Male who presents with a chief complaint of Currently admitted to medicine with the primary diagnosis of Acute pancreatitis      Today is hospital day 1d, and this morning he is           OBJECTIVE  PAST MEDICAL & SURGICAL HISTORY  Hiatal hernia    Duodenitis    HLD (hyperlipidemia)    History of cholecystectomy      ALLERGIES:  No Known Allergies    MEDICATIONS:  STANDING MEDICATIONS  chlorhexidine 2% Cloths 1 Application(s) Topical <User Schedule>  influenza  Vaccine (HIGH DOSE) 0.5 milliLiter(s) IntraMuscular once  lactated ringers. 1000 milliLiter(s) IV Continuous <Continuous>  ondansetron Injectable 4 milliGRAM(s) IV Push once  rosuvastatin 10 milliGRAM(s) Oral at bedtime    PRN MEDICATIONS  temazepam 15 milliGRAM(s) Oral at bedtime PRN      VITAL SIGNS: Last 24 Hours  T(C): 36.3 (15 Oct 2024 04:15), Max: 37 (14 Oct 2024 20:06)  T(F): 97.4 (15 Oct 2024 04:15), Max: 98.6 (14 Oct 2024 20:06)  HR: 72 (15 Oct 2024 04:15) (72 - 84)  BP: 101/60 (15 Oct 2024 04:15) (101/60 - 135/76)  BP(mean): --  RR: 18 (15 Oct 2024 04:15) (16 - 18)  SpO2: 97% (15 Oct 2024 04:15) (97% - 99%)    LABS:                        12.8   8.62  )-----------( 126      ( 15 Oct 2024 05:42 )             38.3     10-15    140  |  102  |  16  ----------------------------<  88  4.2   |  27  |  1.1    Ca    8.6      15 Oct 2024 05:42  Phos  3.0     10-15  Mg     2.2     10-15    TPro  5.8[L]  /  Alb  3.4[L]  /  TBili  1.5[H]  /  DBili  x   /  AST  53[H]  /  ALT  178[H]  /  AlkPhos  184[H]  10-15    PT/INR - ( 15 Oct 2024 05:42 )   PT: 14.30 sec;   INR: 1.25 ratio         PTT - ( 15 Oct 2024 05:42 )  PTT:33.9 sec  Urinalysis Basic - ( 15 Oct 2024 05:42 )    Color: x / Appearance: x / SG: x / pH: x  Gluc: 88 mg/dL / Ketone: x  / Bili: x / Urobili: x   Blood: x / Protein: x / Nitrite: x   Leuk Esterase: x / RBC: x / WBC x   Sq Epi: x / Non Sq Epi: x / Bacteria: x                RADIOLOGY:      PHYSICAL EXAM:    GENERAL: NAD, well-developed, AAOx3  HEENT:  Atraumatic, Normocephalic  PULMONARY: Clear to auscultation bilaterally; No wheeze  CARDIOVASCULAR: Regular rate and rhythm; No murmurs, rubs, or gallops  GASTROINTESTINAL: Soft, Nontender to light palpation of abdomen, Nondistended  MUSCULOSKELETAL:  2+ Peripheral Pulses  NEUROLOGY: non-focal  SKIN: No rashes or lesions       MARTINEZ DOBBS 74y Male  MRN#: [id number]       SUBJECTIVE  Patient is a 74y old Male who presents with a chief complaint of Currently admitted to medicine with the primary diagnosis of Acute pancreatitis                 OBJECTIVE  PAST MEDICAL & SURGICAL HISTORY  Hiatal hernia    Duodenitis    HLD (hyperlipidemia)    History of cholecystectomy      ALLERGIES:  No Known Allergies    MEDICATIONS:  STANDING MEDICATIONS  chlorhexidine 2% Cloths 1 Application(s) Topical <User Schedule>  influenza  Vaccine (HIGH DOSE) 0.5 milliLiter(s) IntraMuscular once  lactated ringers. 1000 milliLiter(s) IV Continuous <Continuous>  ondansetron Injectable 4 milliGRAM(s) IV Push once  rosuvastatin 10 milliGRAM(s) Oral at bedtime    PRN MEDICATIONS  temazepam 15 milliGRAM(s) Oral at bedtime PRN      VITAL SIGNS: Last 24 Hours  T(C): 36.3 (15 Oct 2024 04:15), Max: 37 (14 Oct 2024 20:06)  T(F): 97.4 (15 Oct 2024 04:15), Max: 98.6 (14 Oct 2024 20:06)  HR: 72 (15 Oct 2024 04:15) (72 - 84)  BP: 101/60 (15 Oct 2024 04:15) (101/60 - 135/76)  BP(mean): --  RR: 18 (15 Oct 2024 04:15) (16 - 18)  SpO2: 97% (15 Oct 2024 04:15) (97% - 99%)    LABS:                        12.8   8.62  )-----------( 126      ( 15 Oct 2024 05:42 )             38.3     10-15    140  |  102  |  16  ----------------------------<  88  4.2   |  27  |  1.1    Ca    8.6      15 Oct 2024 05:42  Phos  3.0     10-15  Mg     2.2     10-15    TPro  5.8[L]  /  Alb  3.4[L]  /  TBili  1.5[H]  /  DBili  x   /  AST  53[H]  /  ALT  178[H]  /  AlkPhos  184[H]  10-15    PT/INR - ( 15 Oct 2024 05:42 )   PT: 14.30 sec;   INR: 1.25 ratio         PTT - ( 15 Oct 2024 05:42 )  PTT:33.9 sec  Urinalysis Basic - ( 15 Oct 2024 05:42 )    Color: x / Appearance: x / SG: x / pH: x  Gluc: 88 mg/dL / Ketone: x  / Bili: x / Urobili: x   Blood: x / Protein: x / Nitrite: x   Leuk Esterase: x / RBC: x / WBC x   Sq Epi: x / Non Sq Epi: x / Bacteria: x                RADIOLOGY:      PHYSICAL EXAM:    GENERAL: NAD, well-developed, AAOx3  HEENT:  Atraumatic, Normocephalic  PULMONARY: Clear to auscultation bilaterally; No wheeze  CARDIOVASCULAR: Regular rate and rhythm; No murmurs, rubs, or gallops  GASTROINTESTINAL: Soft, Nontender to light palpation of abdomen, Nondistended  MUSCULOSKELETAL:  2+ Peripheral Pulses  NEUROLOGY: non-focal  SKIN: No rashes or lesions

## 2024-10-15 NOTE — CONSULT NOTE ADULT - ASSESSMENT
Assessment and Plan  Case of a 74 year old male patient known to have a history of DL and cholelithiasis s/p CCY who presented to the ED on 10/14 for evaluation of post prandial abdominal pain associated with nausea and vomiting, found to have elevated lipase and liver enzymes on blood work and unremarkable imaging. We are consulted for concern of gallstone pancreatitis.      Elevated Lipase with Direct Hyperbilirubinemia and Transaminitis  Suspect Gallstone Pancreatitis Versus Colic in Setting of History of CCY in 01/2024  Left Hepatic Lobe Cyst  * Status post robotic assisted lap CCY on 01/29/2024  * Presented on 10/14 for 1 week of intermittent LUQ and LLQ pains radiating to epigastric area and not back; associated with nausea and 4-5 episodes overnight from SUN to MON; associated with chills but no fever or weight loss or diarrhea; baseline BM: daily with intermittent blood on wiping  * Currently Hemodynamically stable; had sinus tachycardia on admission  * Labs: WBC 13.71, Hb 14.9, Plt 130, Na 135, K 4.8, BUN 16, Cr 1.4 on 10/14  * Serum lipase 1164 on 10/14 and 1221 on 10/15  * LA 1.7 on 10/14  *  on 10/14-> 133 on 10/15  * RUQ US 10/14 normal liver; CBD 7mm; CCY  * CT Abdomen and Pelvis w/ IV Cont (10.14.24 @ 14:30) Subcentimeter left hepatic lobe hypodensity is too small to characterize. The liver is otherwise grossly within normal limits.      RECOMMENDATIONS      Thank you for your consult.  - Please note that plan was communicated with medical team.   - Please reach GI on [phone] during weekdays till 5pm.  - Please call the GI service line after 5pm on Weekdays and anytime on Weekends: [phone].      Lonny Brown MD  PGY - 5 Gastroenterology Fellow   [facility]     Assessment and Plan  Case of a 74 year old male patient known to have a history of DL and cholelithiasis s/p CCY who presented to the ED on 10/14 for evaluation of post prandial abdominal pain associated with nausea and vomiting, found to have elevated lipase and liver enzymes on blood work and unremarkable imaging. We are consulted for concern of gallstone pancreatitis.      Elevated Lipase with Direct Hyperbilirubinemia and Transaminitis  Suspect Gallstone Pancreatitis Versus Colic in Setting of History of CCY in 01/2024  Left Hepatic Lobe Cyst  * Status post robotic assisted lap CCY on 01/29/2024  * Presented on 10/14 for 1 week of intermittent LUQ and LLQ pains radiating to epigastric area and not back; associated with nausea and 4-5 episodes overnight from SUN to MON; associated with chills but no fever or weight loss or diarrhea; baseline BM: daily with intermittent blood on wiping  * Currently Hemodynamically stable; had sinus tachycardia on admission  * Labs: WBC 13.71, Hb 14.9, Plt 130, Na 135, K 4.8, BUN 16, Cr 1.4 on 10/14  * LFTs 2.4/247/114/300 on 10/14 -> 1.5/184/53/178 on 10/15; D-bili 1.2 on 10/14  * Serum lipase 1164 on 10/14 and 1221 on 10/15  * LA 1.7 on 10/14  *  on 10/14-> 133 on 10/15  * RUQ US 10/14 normal liver; CBD 7mm; CCY  * CT Abdomen and Pelvis w/ IV Cont (10.14.24 @ 14:30) Subcentimeter left hepatic lobe hypodensity is too small to characterize. The liver is otherwise grossly within normal limits.  * No previous EGD  * Had colonoscopy few months ago with Dr Brody: few polyps per patient; plan was to repeat in 3 years  * No Family history of GI cancers    RECOMMENDATIONS  - Please check MRCP with and without gadolinium  - Trend LFTs closely  - Depending on clinical progression and LFTs trend, might benefit from ERCP; recommended transfer to [place] on 10/14  - Advance diet as tolerated. Recommend IV fluid hydration  - Monitor pain: management per team  - Monitor for nausea: recommend antiemetics PRN if QTc is within normal        Thank you for your consult.  - Please note that plan was communicated with medical team.   - Please reach GI on [phone] during weekdays till 5pm.  - Please call the GI service line after 5pm on Weekdays and anytime on Weekends: [phone].      Lonny Brown MD  PGY - 5 Gastroenterology Fellow   [facility]     Assessment and Plan  Case of a 74 year old male patient known to have a history of DL and cholelithiasis s/p CCY who presented to the ED on 10/14 for evaluation of post prandial abdominal pain associated with nausea and vomiting, found to have elevated lipase and liver enzymes on blood work and unremarkable imaging. We are consulted for concern of gallstone pancreatitis.      Elevated Lipase with Direct Hyperbilirubinemia and Transaminitis  Suspect Gallstone Pancreatitis Versus Colic in Setting of History of CCY in 01/2024  Left Hepatic Lobe Cyst  * Status post robotic assisted lap CCY on 01/29/2024  * Presented on 10/14 for 1 week of intermittent LUQ and LLQ pains radiating to epigastric area and not back; associated with nausea and 4-5 episodes overnight from SUN to MON; associated with chills but no fever or weight loss or diarrhea; baseline BM: daily with intermittent blood on wiping  * Currently Hemodynamically stable; had sinus tachycardia on admission  * Labs: WBC 13.71, Hb 14.9, Plt 130, Na 135, K 4.8, BUN 16, Cr 1.4 on 10/14  * LFTs 2.4/247/114/300 on 10/14 -> 1.5/184/53/178 on 10/15; D-bili 1.2 on 10/14  * Serum lipase 1164 on 10/14 and 1221 on 10/15  * LA 1.7 on 10/14  *  on 10/14-> 133 on 10/15  * RUQ US 10/14 normal liver; CBD 7mm; CCY  * CT Abdomen and Pelvis w/ IV Cont (10.14.24 @ 14:30) Subcentimeter left hepatic lobe hypodensity is too small to characterize. The liver is otherwise grossly within normal limits.  * No previous EGD  * Had colonoscopy few months ago with Dr Brody: few polyps per patient; plan was to repeat in 3 years  * No Family history of GI cancers    RECOMMENDATIONS  - Please check MRCP with and without gadolinium  - Trend LFTs closely  - Depending on clinical progression and LFTs trend, might benefit from ERCP; recommended transfer to [place] on 10/14 in case ERCP is needed  - Supportive measures: advance diet as tolerated (target Low fat, dairy free regular diet); lifestyle modifications (alcohol abstinence)  - Recommend aggressive IV fluid hydration (on LR at 250 mL/hour) and monitor volume status closely. Trend LA  - Trend Hb, Ca, and other electrolytes  - Monitor urine output daily - Target Urine output: 0.5cc/kg/hr  - Monitor BUN and HCT BID - Target BUN <20 and HCT <44 and adjust fluids accordingly  - Monitor off antibiotics  - Monitor pain: management per team  - Monitor for nausea: recommend antiemetics PRN if QTc is within normal  - Recommend starting PO protonix 40mg QD for GI Prophylaxis  - Avoid NSAIDs      Intermittent Hematochezia- Likely Hemorrhoidal  No Anemia  * Hb 14.9 on 10/14  * No hx of anemia or iron studies  * Reported blood on wiping once monthly for years mainly when straining  * No previous EGD  * Had colonoscopy few months ago with Dr Brody: few polyps per patient; plan was to repeat in 3 years  * No Family history of GI cancers    RECOMMENDATIONS  - Trend CBC closely and keep active T/S  - Monitor BM. Recommend bowel regimen in case of constipation. Had BM on 10/14  - Outpatient follow up at Dr Brody's office to repeat colonoscopy when indicated      Thank you for your consult.  - Please note that plan was communicated with medical team.   - Please reach GI on [phone] during weekdays till 5pm.  - Please call the GI service line after 5pm on Weekdays and anytime on Weekends: [phone].      Lonny Brown MD  PGY - 5 Gastroenterology Fellow   [facility]     Assessment and Plan  Case of a 74 year old male patient known to have a history of DL and cholelithiasis s/p CCY who presented to the ED on 10/14 for evaluation of post prandial abdominal pain associated with nausea and vomiting, found to have elevated lipase and liver enzymes on blood work and unremarkable imaging. We are consulted for concern of gallstone pancreatitis.      Elevated Lipase with Direct Hyperbilirubinemia and Transaminitis  Suspect Gallstone Pancreatitis Versus Colic in Setting of History of CCY in 01/2024  Left Hepatic Lobe Cyst  * Status post robotic assisted lap CCY on 01/29/2024  * Social history: drinks 1 glass of wine weekly; last one was 10 days ago  * Presented on 10/14 for 1 week of intermittent LUQ and LLQ pains radiating to epigastric area and not back; associated with nausea and 4-5 episodes overnight from SUN to MON; associated with chills but no fever or weight loss or diarrhea; baseline BM: daily with intermittent blood on wiping  * Currently Hemodynamically stable; had sinus tachycardia on admission  * Labs: WBC 13.71, Hb 14.9, Plt 130, Na 135, K 4.8, BUN 16, Cr 1.4 on 10/14  * LFTs 2.4/247/114/300 on 10/14 -> 1.5/184/53/178 on 10/15; D-bili 1.2 on 10/14  * Serum lipase 1164 on 10/14 and 1221 on 10/15  * LA 1.7 on 10/14  *  on 10/14-> 133 on 10/15  * RUQ US 10/14 normal liver; CBD 7mm; CCY  * CT Abdomen and Pelvis w/ IV Cont (10.14.24 @ 14:30) Subcentimeter left hepatic lobe hypodensity is too small to characterize. The liver is otherwise grossly within normal limits.  * No previous EGD  * Had colonoscopy few months ago with Dr Brody: few polyps per patient; plan was to repeat in 3 years  * No Family history of GI cancers    RECOMMENDATIONS  - Please check MRCP with and without gadolinium  - Trend LFTs closely  - Depending on clinical progression and LFTs trend, might benefit from ERCP; recommended transfer to [place] on 10/14 in case ERCP is needed  - Supportive measures: advance diet as tolerated (target Low fat, dairy free regular diet); lifestyle modifications (alcohol abstinence)  - Recommend aggressive IV fluid hydration (on LR at 250 mL/hour) and monitor volume status closely. Trend LA  - Trend Hb, Ca, and other electrolytes  - Monitor urine output daily - Target Urine output: 0.5cc/kg/hr  - Monitor BUN and HCT BID - Target BUN <20 and HCT <44 and adjust fluids accordingly  - Monitor off antibiotics  - Monitor pain: management per team  - Monitor for nausea: recommend antiemetics PRN if QTc is within normal  - Recommend starting PO protonix 40mg QD for GI Prophylaxis  - Counseled about importance of alcohol cessation  - Avoid NSAIDs      Intermittent Hematochezia- Likely Hemorrhoidal  No Anemia  * Hb 14.9 on 10/14  * No hx of anemia or iron studies  * Reported blood on wiping once monthly for years mainly when straining  * No previous EGD  * Had colonoscopy few months ago with Dr Brody: few polyps per patient; plan was to repeat in 3 years  * No Family history of GI cancers    RECOMMENDATIONS  - Trend CBC closely and keep active T/S  - Monitor BM. Recommend bowel regimen in case of constipation. Had BM on 10/14  - Outpatient follow up at Dr Brody's office to repeat colonoscopy when indicated      Thank you for your consult.  - Please note that plan was communicated with medical team.   - Please reach GI on [phone] during weekdays till 5pm.  - Please call the GI service line after 5pm on Weekdays and anytime on Weekends: [phone].      Lonny Brown MD  PGY - 5 Gastroenterology Fellow   [facility]     Assessment and Plan  Case of a 74 year old male patient known to have a history of DL and cholelithiasis s/p CCY who presented to the ED on 10/14 for evaluation of post prandial abdominal pain associated with nausea and vomiting, found to have elevated lipase and liver enzymes on blood work and unremarkable imaging. We are consulted for concern of gallstone pancreatitis.      Elevated Lipase with Direct Hyperbilirubinemia and Transaminitis  Suspect Gallstone Pancreatitis Versus Colic in Setting of History of CCY in 01/2024  Left Hepatic Lobe Cyst  * Status post robotic assisted lap CCY on 01/29/2024  * Social history: drinks 1 glass of wine weekly; last one was 10 days ago  * Presented on 10/14 for 1 week of intermittent LUQ and LLQ pains radiating to epigastric area and not back; associated with nausea and 4-5 episodes overnight from SUN to MON; associated with chills but no fever or weight loss or diarrhea; baseline BM: daily with intermittent blood on wiping  * Currently Hemodynamically stable; had sinus tachycardia on admission  * Labs: WBC 13.71, Hb 14.9, Plt 130, Na 135, K 4.8, BUN 16, Cr 1.4 on 10/14  * LFTs 2.4/247/114/300 on 10/14 -> 1.5/184/53/178 on 10/15; D-bili 1.2 on 10/14  * Serum lipase 1164 on 10/14 and 1221 on 10/15  * LA 1.7 on 10/14  *  on 10/14-> 133 on 10/15  * RUQ US 10/14 normal liver; CBD 7mm; CCY  * CT Abdomen and Pelvis w/ IV Cont (10.14.24 @ 14:30) Subcentimeter left hepatic lobe hypodensity is too small to characterize. The liver is otherwise grossly within normal limits.  * No previous EGD  * Had colonoscopy few months ago with Dr Brody: few polyps per patient; plan was to repeat in 3 years  * No Family history of GI cancers    RECOMMENDATIONS  - Please check MRCP   - Trend LFTs closely, improving now, with no evidence of cholangitis   - Supportive measures: advance diet as tolerated (target Low fat, dairy free regular diet); lifestyle modifications (alcohol abstinence)  - Recommend aggressive IV fluid hydration  and monitor volume status closely. Trend LA  - Trend Hb, Ca, and other electrolytes  - Monitor urine output daily - Target Urine output: 0.5cc/kg/hr  - Monitor BUN and HCT BID - Target BUN <20 and HCT <44 and adjust fluids accordingly  - Monitor pain: management per team  - Monitor for nausea: recommend antiemetics PRN if QTc is within normal  - Recommend starting PO Protonix 40mg QD for GI Prophylaxis  - Counseled about importance of alcohol cessation  - Avoid NSAIDs      Intermittent Hematochezia- Likely Hemorrhoidal  No Anemia  * Hb 14.9 on 10/14  * No hx of anemia or iron studies  * Reported blood on wiping once monthly for years mainly when straining  * No previous EGD  * Had colonoscopy few months ago with Dr Brody: few polyps per patient; plan was to repeat in 3 years  * No Family history of GI cancers    RECOMMENDATIONS  - Trend CBC closely and keep active T/S  - Monitor BM. Recommend bowel regimen in case of constipation. Had BM on 10/14  - Outpatient follow up at Dr Brody's office to repeat colonoscopy when indicated      Thank you for your consult.  - Please note that plan was communicated with medical team.   - Please reach GI on [phone] during weekdays till 5pm.  - Please call the GI service line after 5pm on Weekdays and anytime on Weekends: [phone].      Lonny Brown MD  PGY - 5 Gastroenterology Fellow   [facility]

## 2024-10-15 NOTE — PROGRESS NOTE ADULT - ASSESSMENT
75 y/o M with a pmhx of HLD, s/p cholecystectomy with Dr. Baxter in February 2024,  who presents complaining of persistent left sided abdominal pain radiating to the epigastrium region for the past 1 week.      # Diffuse abdominal pain with nausea, vomiting, possibly gallstone pancreatitis  # Elevated Lipase and LFTs  # Hx of cholestectomy in February 2024 with Dr. Baxter   -Sepsis not present on admission  -Lipase 1164, LFTs elevated (mixed pattern, improving)  - CT abdomen and RUQ sonogram: No acute abnormality in the abdomen and pelvis.  -Pain control  -Continue with aggressive IV hydration  -D/C IV zosyn  -currently NPO, advance diet with continued improvement in symptoms  -Trend LFTs  -CT abdomen and RUQ sonogram: No acute abnormality in the abdomen and pelvis.  -GI consulted and recommendations appreciated, Case discussed with elena and Dr. Brown (GI fellow), pt to be transferred [facility] for closer monitoring and possible ERCP    #HLD  Continue with statin.       VTE ppx: Heparin  GI ppx: not indicated.   Diet: NPO  CHG ordered.  Ambulate as tolerated.   Plan to transfer pt to [facility] for closer monitoring, possible ERCP    75 y/o M with a pmhx of HLD, s/p cholecystectomy with Dr. Baxter in February 2024,  who presents complaining of persistent left sided abdominal pain radiating to the epigastrium region for the past 1 week.      # Diffuse abdominal pain with nausea, vomiting, possibly gallstone pancreatitis  # Elevated Lipase and LFTs  # Hx of cholestectomy in February 2024 with Dr. Baxter   -Sepsis not present on admission  -Lipase 1164, LFTs elevated (mixed pattern, improving)  - CT abdomen and RUQ sonogram: No acute abnormality in the abdomen and pelvis.  -Pain control  -Continue with aggressive IV hydration  -D/C IV zosyn  -currently NPO, advance diet with continued improvement in symptoms  -Trend LFTs  -CT abdomen and RUQ sonogram: No acute abnormality in the abdomen and pelvis.  -GI consulted and recommendations appreciated, Case discussed with elena and Dr. Bronw (GI fellow), pt to be transferred [facility] for closer monitoring and possible ERCP    #HLD  Hold statin.       VTE ppx: Heparin  GI ppx: not indicated.   Diet: NPO  CHG ordered.  Ambulate as tolerated.   Plan to transfer pt to [facility] for closer monitoring, possible ERCP

## 2024-10-15 NOTE — CONSULT NOTE ADULT - ATTENDING COMMENTS
Low to moderate suspicion of retained CBD stone causing pancreatitis and biliary obstruction.  Imaging reviewed.  Plan per GI, transfer to tertiary center for MRCP/ERCP decision making by GI. Low to moderate suspicion of retained CBD stone causing pancreatitis and biliary obstruction.  Imaging reviewed.  Discussed case with Dr. Garcia. Plan per GI, transfer to tertiary center for MRCP/ERCP decision making by GI. Low to moderate suspicion of retained CBD stone causing pancreatitis and biliary obstruction.  Imaging reviewed - CBD WNL size.  Discussed case with Dr. Garcia. Plan per GI, transfering to tertiary center for MRCP/ERCP, agree with decision making by GI. Yes

## 2024-10-15 NOTE — CONSULT NOTE ADULT - SUBJECTIVE AND OBJECTIVE BOX
GENERAL SURGERY CONSULT NOTE    Patient: MARTINEZ DOBBS , 74y ([birth year])Male   MRN: [id number]  Location: [place] [address] A  Visit: 10-14-24 Inpatient  Date: 10-15-24 @ 15:17    HPI:  "Patient is a 73 y/o M with a pmhx of HLD, s/p cholecystectomy with Dr. Baxter in February 2024,  who presents complaining of persistent left sided abdominal pain radiating to the epigastrium region for the past 1 week. Describes the pain as "stabbing" rating it a 10/10 in intensity. Associated with multiple episodes of NBNB vomiting, nausea and chills. (-) fever. States the pain started suddenly several hours after eating soup. Last bowel movement this morning. States he's been doing well after the cholecystomy. States the pain is similar to the gall stones. Otherwise patient denies fever, chest pain, diarrhea, constipation,  (14 Oct 2024 19:17)"    Since admission, lab tests significant for elevated LFTs, bilirubin, lipase concerning for possible gallstone pancreatitis s/p laparoscopic cholecystectomy in feb 2024. At this time he denies any residual abdominal pain, nausea, vomiting, fevers, chills, shortness of breath chest pain or any other acute symptoms at this time.       PAST MEDICAL & SURGICAL HISTORY:  Hiatal hernia  Duodenitis  HLD (hyperlipidemia)  History of cholecystectomy    Home Medications:  rosuvastatin 10 mg oral capsule: 1 cap(s) orally (18 Jan 2024 01:12)    VITALS:  T(F): 98.2 (10-15-24 @ 13:46), Max: 98.6 (10-14-24 @ 20:06)  HR: 85 (10-15-24 @ 13:46) (72 - 85)  BP: 127/77 (10-15-24 @ 13:46) (101/60 - 135/76)  RR: 18 (10-15-24 @ 04:15) (16 - 18)  SpO2: 98% (10-15-24 @ 13:46) (97% - 99%)    PHYSICAL EXAM:  General: NAD, AAOx3, calm and cooperative  HEENT: NCAT, KRISTAL, EOMI, Trachea ML, Neck supple  Cardiac: Regular rate  Respiratory: Equal chest rise bilaterally, no audible wheezing  Abdomen: Soft, non-distended, non-tender, no rebound, no guarding. +BS.  Musculoskeletal: Strength 5/5 BL UE/LE, ROM intact, compartments soft  Neuro: Sensation grossly intact and equal throughout, no focal deficits  Vascular: Pulses 2+ throughout, extremities well perfused  Skin: Warm/dry, normal color, no jaundice    MEDICATIONS  (STANDING):  chlorhexidine 2% Cloths 1 Application(s) Topical <User Schedule>  influenza  Vaccine (HIGH DOSE) 0.5 milliLiter(s) IntraMuscular once  lactated ringers. 1000 milliLiter(s) (250 mL/Hr) IV Continuous <Continuous>  ondansetron Injectable 4 milliGRAM(s) IV Push once    MEDICATIONS  (PRN):  temazepam 15 milliGRAM(s) Oral at bedtime PRN Insomnia      LAB/STUDIES:                        12.8   8.62  )-----------( 126      ( 15 Oct 2024 05:42 )             38.3     10-15    140  |  102  |  16  ----------------------------<  88  4.2   |  27  |  1.1    Ca    8.6      15 Oct 2024 05:42  Phos  3.0     10-15  Mg     2.2     10-15    TPro  5.8[L]  /  Alb  3.4[L]  /  TBili  1.5[H]  /  DBili  x   /  AST  53[H]  /  ALT  178[H]  /  AlkPhos  184[H]  10-15    PT/INR - ( 15 Oct 2024 05:42 )   PT: 14.30 sec;   INR: 1.25 ratio         PTT - ( 15 Oct 2024 05:42 )  PTT:33.9 sec  LIVER FUNCTIONS - ( 15 Oct 2024 05:42 )  Alb: 3.4 g/dL / Pro: 5.8 g/dL / ALK PHOS: 184 U/L / ALT: 178 U/L / AST: 53 U/L / GGT: x           Urinalysis Basic - ( 15 Oct 2024 05:42 )    Color: x / Appearance: x / SG: x / pH: x  Gluc: 88 mg/dL / Ketone: x  / Bili: x / Urobili: x   Blood: x / Protein: x / Nitrite: x   Leuk Esterase: x / RBC: x / WBC x   Sq Epi: x / Non Sq Epi: x / Bacteria: x    IMAGING:    < from: US Abdomen Upper Quadrant Right (10.14.24 @ 16:30) >  Status post cholecystectomy. No acute abnormality in the right upper   quadrant.    < end of copied text >  < from: CT Abdomen and Pelvis w/ IV Cont (10.14.24 @ 14:30) >  No acute abnormality in the abdomen and pelvis.    < end of copied text >      
Gastroenterology Initial Consult Note      Location: [address] ([address])  Patient Name: MARTINEZ DOBBS  Age: 74y  Gender: Male      Chief Complaint  Patient is a 74y old Male who presents with a chief complaint of   Primary diagnosis of Acute pancreatitis without infection or necrosis        Reason for Consult  Gallstone Pancreatitis      History of Present Illness  74 year old male patient known to have a history of DL and cholelithiasis s/p CCY who presented to the ED on 10/14 for evaluation of post prandial abdominal pain associated with nausea and vomiting, found to have elevated lipase and liver enzymes on blood work and unremarkable imaging. We are consulted for concern of gallstone pancreatitis.  Summary:  * Status post robotic assisted lap CCY on 01/29/2024  * Presented on 10/14 for 1 week of intermittent LUQ and LLQ pains radiating to epigastric area and not back; associated with nausea and 4-5 episodes overnight from SUN to MON; associated with chills but no fever or weight loss or diarrhea; baseline BM: daily with intermittent blood on wiping  * Currently Hemodynamically stable; had sinus tachycardia on admission  * Labs: WBC 13.71, Hb 14.9, Plt 130, Na 135, K 4.8, BUN 16, Cr 1.4 on 10/14  * LFTs 2.4/247/114/300 on 10/14 -> 1.5/184/53/178 on 10/15; D-bili 1.2 on 10/14  * Serum lipase 1164 on 10/14 and 1221 on 10/15  * LA 1.7 on 10/14  *  on 10/14-> 133 on 10/15  * RUQ US 10/14 normal liver; CBD 7mm; CCY  * CT Abdomen and Pelvis w/ IV Cont (10.14.24 @ 14:30) Subcentimeter left hepatic lobe hypodensity is too small to characterize. The liver is otherwise grossly within normal limits.  * No previous EGD  * Had colonoscopy few months ago with Dr Brody: few polyps per patient; plan was to repeat in 3 years  * No Family history of GI cancers      Prior EGD:  as below      Prior Colonoscopy:  as below      Past Medical and Surgical History:  Hiatal hernia    Duodenitis    HLD (hyperlipidemia)    History of cholecystectomy        Home Medications:  Home Medications:  rosuvastatin 10 mg oral capsule: 1 cap(s) orally (18 Jan 2024 01:12)      Social History:  Social History:  Substance Use (street drugs): ( x ) never used  (  ) other:  Tobacco Usage:  ( x  ) never smoked   (   ) former smoker   (   ) current smoker  (     ) pack year  Alcohol Usage: None (14 Oct 2024 19:17)      Allergies:  No Known Allergies      Family History:  no GI cancers      Vital Signs in the last 24 hours   Vitals Summary T(C): 36.8 (10-15-24 @ 13:46), Max: 37 (10-14-24 @ 20:06)  HR: 85 (10-15-24 @ 13:46) (72 - 85)  BP: 127/77 (10-15-24 @ 13:46) (101/60 - 135/76)  RR: 18 (10-15-24 @ 04:15) (16 - 18)  SpO2: 98% (10-15-24 @ 13:46) (97% - 99%)  Vent Data   Intake/ Output   Measurements       Physical Exam  * General Appearance: Alert, cooperative, interactive, oriented to time, place, and person, in no acute distress  * Eyes: PERRL, conjunctiva/corneas clear, EOM's intact, fundi benign, both eyes  * Neck: Supple, symmetrical, trachea midline, no adenopathy   * Lungs: Good bilateral air entry, normal breath sounds  * Heart: Regular Rate and Rhythm, normal S1 and S2, no audible murmur, rub, or gallop  * Abdomen: Symmetric, non-distended, soft, non-tender, bowel sounds active all four quadrants      Investigations   Laboratory Workup      - CBC:                        12.8   8.62  )-----------( 126      ( 15 Oct 2024 05:42 )             38.3       - Hgb Trend:  12.8  10-15-24 @ 05:42  14.9  10-14-24 @ 12:30        - Chemistry:  10-15    140  |  102  |  16  ----------------------------<  88  4.2   |  27  |  1.1    Ca    8.6      15 Oct 2024 05:42  Phos  3.0     10-15  Mg     2.2     10-15    TPro  5.8[L]  /  Alb  3.4[L]  /  TBili  1.5[H]  /  DBili  x   /  AST  53[H]  /  ALT  178[H]  /  AlkPhos  184[H]  10-15    Liver panel trend:  TBili 1.5   /   AST 53   /      /   AlkP 184   /   Tptn 5.8   /   Alb 3.4    /   DBili --      10-15  TBili 2.4   /      /      /   AlkP 247   /   Tptn 6.9   /   Alb 4.2    /   DBili 1.2      10-14      - Coagulation Studies:  PT/INR - ( 15 Oct 2024 05:42 )   PT: 14.30 sec;   INR: 1.25 ratio         PTT - ( 15 Oct 2024 05:42 )  PTT:33.9 sec    - ABG:      - Cardiac Markers:        Microbiological Workup  Urinalysis Basic - ( 15 Oct 2024 05:42 )    Color: x / Appearance: x / SG: x / pH: x  Gluc: 88 mg/dL / Ketone: x  / Bili: x / Urobili: x   Blood: x / Protein: x / Nitrite: x   Leuk Esterase: x / RBC: x / WBC x   Sq Epi: x / Non Sq Epi: x / Bacteria: x          Radiological Workup    US Abdomen Upper Quadrant Right:   ACC: [id number] EXAM:  US ABDOMEN RT UPR QUADRANT   ORDERED BY: CINTHIA LOJA     PROCEDURE DATE:  10/14/2024          INTERPRETATION:  CLINICAL INFORMATION: Right upper quadrant pain    COMPARISON: None available.    TECHNIQUE: Sonography of the right upper quadrant.    FINDINGS:  Liver: Within normal limits.  Bile ducts: Normal caliber. Common bile duct measures 7 mm.  Gallbladder: Cholecystectomy.  Pancreas: Visualized portions are within normal limits.  Right kidney: 10.6 cm. No hydronephrosis. 5.4 cm lower pole cyst.  Ascites: None.  IVC: Visualized portions are within normal limits.    IMPRESSION:  Status post cholecystectomy. No acute abnormality in the right upper   quadrant.          VELVET RAMIREZ MD; Attending Radiologist  This document has been electronically signed. Oct 14 2024  5:43PM (10-14-24 @ 16:30)          Current Medications  Standing Medications  chlorhexidine 2% Cloths 1 Application(s) Topical <User Schedule>  influenza  Vaccine (HIGH DOSE) 0.5 milliLiter(s) IntraMuscular once  lactated ringers. 1000 milliLiter(s) (250 mL/Hr) IV Continuous <Continuous>  ondansetron Injectable 4 milliGRAM(s) IV Push once    PRN Medications  temazepam 15 milliGRAM(s) Oral at bedtime PRN Insomnia    Singles Doses Administered  lactated ringers Bolus 1000 milliLiter(s) IV Bolus once  morphine  - Injectable 4 milliGRAM(s) IV Push once  piperacillin/tazobactam IVPB. 3.375 Gram(s) IV Intermittent once  piperacillin/tazobactam IVPB.- 3.375 Gram(s) IV Intermittent once  piperacillin/tazobactam IVPB.- 3.375 Gram(s) IV Intermittent once

## 2024-10-15 NOTE — CONSULT NOTE ADULT - ASSESSMENT
ASSESSMENT:  74yM w/ PMHx of Duodenitis, HLD, cholecystectomy in February, 2024 who presents complaining of abdominal, epigastric pain, nausea and vomiting. His workup thus far has been concerning for elevated LFTs, bilirubin and lipase concerning for gallstone pancreatitis secondary to retained gallstones.     Physical exam findings, imaging, and labs as documented above.     PLAN:  - MRCP to identify retained gallstones  - Continue to trend LFTs, NPO, IVF  - Possible transfer to [facility] for ERCP   - No acute surgical intervention  - Surgery will follow   - Rest of vare per primary team     [phone]    Above plan discussed with Attending Surgeon Dr. OLIVER, patient, patient family, and Primary team  10-15-24 @ 15:17

## 2024-10-16 LAB
ALBUMIN SERPL ELPH-MCNC: 3.9 G/DL — SIGNIFICANT CHANGE UP (ref 3.5–5.2)
ALP SERPL-CCNC: 189 U/L — HIGH (ref 30–115)
ALT FLD-CCNC: 144 U/L — HIGH (ref 0–41)
ANION GAP SERPL CALC-SCNC: 12 MMOL/L — SIGNIFICANT CHANGE UP (ref 7–14)
APTT BLD: 30.8 SEC — SIGNIFICANT CHANGE UP (ref 27–39.2)
AST SERPL-CCNC: 44 U/L — HIGH (ref 0–41)
BASOPHILS # BLD AUTO: 0.04 K/UL — SIGNIFICANT CHANGE UP (ref 0–0.2)
BASOPHILS NFR BLD AUTO: 0.6 % — SIGNIFICANT CHANGE UP (ref 0–1)
BILIRUB DIRECT SERPL-MCNC: 0.5 MG/DL — HIGH (ref 0–0.3)
BILIRUB INDIRECT FLD-MCNC: 0.5 MG/DL — SIGNIFICANT CHANGE UP (ref 0.2–1.2)
BILIRUB SERPL-MCNC: 1 MG/DL — SIGNIFICANT CHANGE UP (ref 0.2–1.2)
BUN SERPL-MCNC: 15 MG/DL — SIGNIFICANT CHANGE UP (ref 10–20)
CALCIUM SERPL-MCNC: 9.2 MG/DL — SIGNIFICANT CHANGE UP (ref 8.4–10.5)
CHLORIDE SERPL-SCNC: 103 MMOL/L — SIGNIFICANT CHANGE UP (ref 98–110)
CO2 SERPL-SCNC: 27 MMOL/L — SIGNIFICANT CHANGE UP (ref 17–32)
CREAT SERPL-MCNC: 1 MG/DL — SIGNIFICANT CHANGE UP (ref 0.7–1.5)
EGFR: 80 ML/MIN/1.73M2 — SIGNIFICANT CHANGE UP
EOSINOPHIL # BLD AUTO: 0 K/UL — SIGNIFICANT CHANGE UP (ref 0–0.7)
EOSINOPHIL NFR BLD AUTO: 0 % — SIGNIFICANT CHANGE UP (ref 0–8)
GLUCOSE SERPL-MCNC: 114 MG/DL — HIGH (ref 70–99)
HCT VFR BLD CALC: 40 % — LOW (ref 42–52)
HGB BLD-MCNC: 13.7 G/DL — LOW (ref 14–18)
IMM GRANULOCYTES NFR BLD AUTO: 0.1 % — SIGNIFICANT CHANGE UP (ref 0.1–0.3)
INR BLD: 1.06 RATIO — SIGNIFICANT CHANGE UP (ref 0.65–1.3)
LYMPHOCYTES # BLD AUTO: 0.78 K/UL — LOW (ref 1.2–3.4)
LYMPHOCYTES # BLD AUTO: 11.6 % — LOW (ref 20.5–51.1)
MAGNESIUM SERPL-MCNC: 2.2 MG/DL — SIGNIFICANT CHANGE UP (ref 1.8–2.4)
MCHC RBC-ENTMCNC: 29.9 PG — SIGNIFICANT CHANGE UP (ref 27–31)
MCHC RBC-ENTMCNC: 34.3 G/DL — SIGNIFICANT CHANGE UP (ref 32–37)
MCV RBC AUTO: 87.3 FL — SIGNIFICANT CHANGE UP (ref 80–94)
MONOCYTES # BLD AUTO: 0.44 K/UL — SIGNIFICANT CHANGE UP (ref 0.1–0.6)
MONOCYTES NFR BLD AUTO: 6.5 % — SIGNIFICANT CHANGE UP (ref 1.7–9.3)
NEUTROPHILS # BLD AUTO: 5.47 K/UL — SIGNIFICANT CHANGE UP (ref 1.4–6.5)
NEUTROPHILS NFR BLD AUTO: 81.2 % — HIGH (ref 42.2–75.2)
NRBC # BLD: 0 /100 WBCS — SIGNIFICANT CHANGE UP (ref 0–0)
PLATELET # BLD AUTO: 145 K/UL — SIGNIFICANT CHANGE UP (ref 130–400)
PMV BLD: 11.3 FL — HIGH (ref 7.4–10.4)
POTASSIUM SERPL-MCNC: 4.5 MMOL/L — SIGNIFICANT CHANGE UP (ref 3.5–5)
POTASSIUM SERPL-SCNC: 4.5 MMOL/L — SIGNIFICANT CHANGE UP (ref 3.5–5)
PROT SERPL-MCNC: 6.3 G/DL — SIGNIFICANT CHANGE UP (ref 6–8)
PROTHROM AB SERPL-ACNC: 12.1 SEC — SIGNIFICANT CHANGE UP (ref 9.95–12.87)
RBC # BLD: 4.58 M/UL — LOW (ref 4.7–6.1)
RBC # FLD: 12.6 % — SIGNIFICANT CHANGE UP (ref 11.5–14.5)
SODIUM SERPL-SCNC: 142 MMOL/L — SIGNIFICANT CHANGE UP (ref 135–146)
WBC # BLD: 6.74 K/UL — SIGNIFICANT CHANGE UP (ref 4.8–10.8)
WBC # FLD AUTO: 6.74 K/UL — SIGNIFICANT CHANGE UP (ref 4.8–10.8)

## 2024-10-16 PROCEDURE — 99233 SBSQ HOSP IP/OBS HIGH 50: CPT

## 2024-10-16 PROCEDURE — 99232 SBSQ HOSP IP/OBS MODERATE 35: CPT

## 2024-10-16 RX ORDER — SODIUM CHLORIDE IRRIG SOLUTION 0.9 %
1000 SOLUTION, IRRIGATION IRRIGATION
Refills: 0 | Status: DISCONTINUED | OUTPATIENT
Start: 2024-10-16 | End: 2024-10-17

## 2024-10-16 RX ORDER — 5-HYDROXYTRYPTOPHAN (5-HTP) 100 MG
3 TABLET,DISINTEGRATING ORAL AT BEDTIME
Refills: 0 | Status: DISCONTINUED | OUTPATIENT
Start: 2024-10-16 | End: 2024-10-17

## 2024-10-16 RX ORDER — ACETAMINOPHEN 325 MG
650 TABLET ORAL EVERY 6 HOURS
Refills: 0 | Status: DISCONTINUED | OUTPATIENT
Start: 2024-10-16 | End: 2024-10-17

## 2024-10-16 RX ORDER — ENOXAPARIN SODIUM 150 MG/ML
40 INJECTION SUBCUTANEOUS EVERY 24 HOURS
Refills: 0 | Status: DISCONTINUED | OUTPATIENT
Start: 2024-10-16 | End: 2024-10-17

## 2024-10-16 RX ORDER — PANTOPRAZOLE SODIUM 40 MG/1
40 TABLET, DELAYED RELEASE ORAL
Refills: 0 | Status: DISCONTINUED | OUTPATIENT
Start: 2024-10-16 | End: 2024-10-17

## 2024-10-16 RX ADMIN — FLUTICASONE PROPIONATE 1 SPRAY(S): 50 SPRAY, METERED NASAL at 17:08

## 2024-10-16 RX ADMIN — FLUTICASONE PROPIONATE 1 SPRAY(S): 50 SPRAY, METERED NASAL at 05:12

## 2024-10-16 RX ADMIN — Medication 3 MILLIGRAM(S): at 23:50

## 2024-10-16 RX ADMIN — CHLORHEXIDINE GLUCONATE ORAL RINSE 1 APPLICATION(S): 1.2 SOLUTION DENTAL at 05:13

## 2024-10-16 RX ADMIN — Medication 100 MILLILITER(S): at 17:10

## 2024-10-16 NOTE — PROGRESS NOTE ADULT - SUBJECTIVE AND OBJECTIVE BOX
Gastroenterology progress note:     Patient is a 74y old  Male who presents with a chief complaint of      Admitted on: 10-14-24    We are following the patient for:       Interval History:    No acute events overnight.   - Diet -   - last BM -   - Abdominal pain -       PAST MEDICAL & SURGICAL HISTORY:  Hiatal hernia      Duodenitis      HLD (hyperlipidemia)      History of cholecystectomy          MEDICATIONS  (STANDING):  chlorhexidine 2% Cloths 1 Application(s) Topical <User Schedule>  fluticasone propionate 50 MICROgram(s)/spray Nasal Spray 1 Spray(s) Both Nostrils two times a day  influenza  Vaccine (HIGH DOSE) 0.5 milliLiter(s) IntraMuscular once  lactated ringers. 1000 milliLiter(s) (250 mL/Hr) IV Continuous <Continuous>  ondansetron Injectable 4 milliGRAM(s) IV Push once  pantoprazole    Tablet 40 milliGRAM(s) Oral before breakfast    MEDICATIONS  (PRN):  acetaminophen     Tablet .. 650 milliGRAM(s) Oral every 6 hours PRN Temp greater or equal to 38C (100.4F), Mild Pain (1 - 3)  melatonin 3 milliGRAM(s) Oral at bedtime PRN Insomnia  temazepam 15 milliGRAM(s) Oral at bedtime PRN Insomnia      Allergies  No Known Allergies      Review of Systems:   Cardiovascular:  No Chest Pain, No Palpitations  Respiratory:  No Cough, No Dyspnea  Gastrointestinal:  As described in HPI  Skin:  No Skin Lesions, No Jaundice  Neuro:  No Syncope, No Dizziness    Physical Examination:  T(C): 36.2 (10-16-24 @ 07:30), Max: 37.1 (10-16-24 @ 02:28)  HR: 81 (10-16-24 @ 07:30) (71 - 85)  BP: 134/76 (10-16-24 @ 07:30) (123/75 - 134/76)  RR: 18 (10-16-24 @ 02:28) (18 - 18)  SpO2: 97% (10-16-24 @ 02:28) (97% - 98%)        GENERAL: AAOx3, no acute distress.  HEAD:  Atraumatic, Normocephalic  EYES: conjunctiva and sclera clear  NECK: Supple, no JVD or thyromegaly  CHEST/LUNG: Clear to auscultation bilaterally; No wheeze, rhonchi, or rales  HEART: Regular rate and rhythm; normal S1, S2, No murmurs.  ABDOMEN: Soft, nontender, nondistended; Bowel sounds present  NEUROLOGY: No asterixis or tremor.   SKIN: Intact, no jaundice     Data:                        13.7   6.74  )-----------( 145      ( 16 Oct 2024 11:30 )             40.0     Hgb trend:  13.7  10-16-24 @ 11:30  12.8  10-15-24 @ 05:42  14.9  10-14-24 @ 12:30      10-16    142  |  103  |  15  ----------------------------<  114[H]  4.5   |  27  |  1.0    Ca    9.2      16 Oct 2024 11:30  Phos  3.0     10-15  Mg     2.2     10-16    TPro  6.3  /  Alb  3.9  /  TBili  1.0  /  DBili  0.5[H]  /  AST  44[H]  /  ALT  144[H]  /  AlkPhos  189[H]  10-16    Liver panel trend:  TBili 1.0   /   AST 44   /      /   AlkP 189   /   Tptn 6.3   /   Alb 3.9    /   DBili 0.5      10-16  TBili 1.5   /   AST 53   /      /   AlkP 184   /   Tptn 5.8   /   Alb 3.4    /   DBili --      10-15  TBili 2.4   /      /      /   AlkP 247   /   Tptn 6.9   /   Alb 4.2    /   DBili 1.2      10-14      PT/INR - ( 16 Oct 2024 11:30 )   PT: 12.10 sec;   INR: 1.06 ratio         PTT - ( 16 Oct 2024 11:30 )  PTT:30.8 sec       Radiology:    US Abdomen Upper Quadrant Right:   ACC: [id number] EXAM:  US ABDOMEN RT UPR QUADRANT   ORDERED BY: CINTHIA LOJA     PROCEDURE DATE:  10/14/2024          INTERPRETATION:  CLINICAL INFORMATION: Right upper quadrant pain    COMPARISON: None available.    TECHNIQUE: Sonography of the right upper quadrant.    FINDINGS:  Liver: Within normal limits.  Bile ducts: Normal caliber. Common bile duct measures 7 mm.  Gallbladder: Cholecystectomy.  Pancreas: Visualized portions are within normal limits.  Right kidney: 10.6 cm. No hydronephrosis. 5.4 cm lower pole cyst.  Ascites: None.  IVC: Visualized portions are within normal limits.    IMPRESSION:  Status post cholecystectomy. No acute abnormality in the right upper   quadrant.      --- End of Report ---            VELVET RAMIREZ MD; Attending Radiologist  This document has been electronically signed. Oct 14 2024  5:43PM (10-14-24 @ 16:30)    MR MRCP w/wo IV Cont:   ACC: [id number] EXAM:  MR MRCP WAW IC   ORDERED BY: ARTIE BRANDT     PROCEDURE DATE:  10/15/2024          INTERPRETATION:  CLINICAL INFORMATION: Transaminitis, abdominal pain    COMPARISON: CT abdomen and pelvis 10/14/2024, MRI abdomen 5/11/2023.    CONTRAST/COMPLICATIONS:  IV Contrast: Gadavist  9 cc administered   1 cc discarded  Oral Contrast: NONE  Complications: None reported at time of study completion    PROCEDURE:  MRI of the abdomen was performed.  MRCP was performed.    FINDINGS:  LOWER CHEST: Small hiatal hernia.    LIVER: Normal hepatic contour and parenchymal signal. 0.7 cm mildly T2   hyperintense lesion within hepatic segment 8 (series 4, image 11) with   imaging characteristics consistent with hemangioma. 0.4 cm cyst within   hepatic segment 4b (series 4, image 16).  BILE DUCTS: Normal caliber.  GALLBLADDER: Cholecystectomy.  SPLEEN: Within normal limits.  PANCREAS: 1.1 cm cystic lesion within the uncinate process (series 10,   image 19).  Additional 0.6 cm cystic lesion within the uncinate process   (series 10, image 15), likely representing the lesion found on 5/11/2023.   These lesions appear to connect with the pancreatic ductal system. No   appreciable enhancement. No main pancreatic ductal dilation.  ADRENALS: Within normal limits.  KIDNEYS/URETERS: No hydronephrosis. Right renal cyst.    VISUALIZED PORTIONS:  BOWEL: Colonic diverticulosis. Small hiatal hernia.  PERITONEUM: No ascites.  VESSELS: Within normal limits.  RETROPERITONEUM/LYMPH NODES: No lymphadenopathy.  ABDOMINAL WALL: Within normal limits.  BONES: Degenerative changes.    IMPRESSION:    No MRI evidence of acute abdominal pathology.    0.7 cm hemangioma within hepatic segment 8.    1.1 cm and 0.6 cm cystic lesions within the uncinate process of the   pancreas with connection to the pancreatic ductal system. No main   pancreatic ductal dilation. 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 Follow-up contrast enhanced abdominal MRI/MRCP is recommended in   24 months.        --- End of Report ---  JMAA BAY MD; Resident Radiologist  This document has been electronically signed.  VELVET RAMIREZ MD; Attending Radiologist  This document has been electronically signed. Oct 16 2024  9:53AM (10-15-24 @ 17:22)   Gastroenterology progress note:     Patient is a 74y old  Male who presents with a chief complaint of      Admitted on: 10-14-24    We are following the patient for: gall stone pancreatitis       Interval History: abdominal pain improved, MRCP no stones in CBD      PAST MEDICAL & SURGICAL HISTORY:  Hiatal hernia      Duodenitis      HLD (hyperlipidemia)      History of cholecystectomy          MEDICATIONS  (STANDING):  chlorhexidine 2% Cloths 1 Application(s) Topical <User Schedule>  fluticasone propionate 50 MICROgram(s)/spray Nasal Spray 1 Spray(s) Both Nostrils two times a day  influenza  Vaccine (HIGH DOSE) 0.5 milliLiter(s) IntraMuscular once  lactated ringers. 1000 milliLiter(s) (250 mL/Hr) IV Continuous <Continuous>  ondansetron Injectable 4 milliGRAM(s) IV Push once  pantoprazole    Tablet 40 milliGRAM(s) Oral before breakfast    MEDICATIONS  (PRN):  acetaminophen     Tablet .. 650 milliGRAM(s) Oral every 6 hours PRN Temp greater or equal to 38C (100.4F), Mild Pain (1 - 3)  melatonin 3 milliGRAM(s) Oral at bedtime PRN Insomnia  temazepam 15 milliGRAM(s) Oral at bedtime PRN Insomnia      Allergies  No Known Allergies      Review of Systems:   Cardiovascular:  No Chest Pain, No Palpitations  Respiratory:  No Cough, No Dyspnea  Gastrointestinal:  As described in HPI  Skin:  No Skin Lesions, No Jaundice  Neuro:  No Syncope, No Dizziness    Physical Examination:  T(C): 36.2 (10-16-24 @ 07:30), Max: 37.1 (10-16-24 @ 02:28)  HR: 81 (10-16-24 @ 07:30) (71 - 85)  BP: 134/76 (10-16-24 @ 07:30) (123/75 - 134/76)  RR: 18 (10-16-24 @ 02:28) (18 - 18)  SpO2: 97% (10-16-24 @ 02:28) (97% - 98%)        GENERAL: AAOx3, no acute distress.  HEAD:  Atraumatic, Normocephalic  EYES: conjunctiva and sclera clear  NECK: Supple, no JVD or thyromegaly  CHEST/LUNG: Clear to auscultation bilaterally; No wheeze, rhonchi, or rales  HEART: Regular rate and rhythm; normal S1, S2, No murmurs.  ABDOMEN: Soft, nontender, nondistended; Bowel sounds present  NEUROLOGY: No asterixis or tremor.   SKIN: Intact, no jaundice     Data:                        13.7   6.74  )-----------( 145      ( 16 Oct 2024 11:30 )             40.0     Hgb trend:  13.7  10-16-24 @ 11:30  12.8  10-15-24 @ 05:42  14.9  10-14-24 @ 12:30      10-16    142  |  103  |  15  ----------------------------<  114[H]  4.5   |  27  |  1.0    Ca    9.2      16 Oct 2024 11:30  Phos  3.0     10-15  Mg     2.2     10-16    TPro  6.3  /  Alb  3.9  /  TBili  1.0  /  DBili  0.5[H]  /  AST  44[H]  /  ALT  144[H]  /  AlkPhos  189[H]  10-16    Liver panel trend:  TBili 1.0   /   AST 44   /      /   AlkP 189   /   Tptn 6.3   /   Alb 3.9    /   DBili 0.5      10-16  TBili 1.5   /   AST 53   /      /   AlkP 184   /   Tptn 5.8   /   Alb 3.4    /   DBili --      10-15  TBili 2.4   /      /      /   AlkP 247   /   Tptn 6.9   /   Alb 4.2    /   DBili 1.2      10-14      PT/INR - ( 16 Oct 2024 11:30 )   PT: 12.10 sec;   INR: 1.06 ratio         PTT - ( 16 Oct 2024 11:30 )  PTT:30.8 sec       Radiology:    US Abdomen Upper Quadrant Right:   ACC: [id number] EXAM:  US ABDOMEN RT UPR QUADRANT   ORDERED BY: CINTHIA LOJA     PROCEDURE DATE:  10/14/2024          INTERPRETATION:  CLINICAL INFORMATION: Right upper quadrant pain    COMPARISON: None available.    TECHNIQUE: Sonography of the right upper quadrant.    FINDINGS:  Liver: Within normal limits.  Bile ducts: Normal caliber. Common bile duct measures 7 mm.  Gallbladder: Cholecystectomy.  Pancreas: Visualized portions are within normal limits.  Right kidney: 10.6 cm. No hydronephrosis. 5.4 cm lower pole cyst.  Ascites: None.  IVC: Visualized portions are within normal limits.    IMPRESSION:  Status post cholecystectomy. No acute abnormality in the right upper   quadrant.      --- End of Report ---            VELVET RAMIREZ MD; Attending Radiologist  This document has been electronically signed. Oct 14 2024  5:43PM (10-14-24 @ 16:30)    MR MRCP w/wo IV Cont:   ACC: [id number] EXAM:  MR MRCP WAW IC   ORDERED BY: ARTIE BRANDT     PROCEDURE DATE:  10/15/2024          INTERPRETATION:  CLINICAL INFORMATION: Transaminitis, abdominal pain    COMPARISON: CT abdomen and pelvis 10/14/2024, MRI abdomen 5/11/2023.    CONTRAST/COMPLICATIONS:  IV Contrast: Gadavist  9 cc administered   1 cc discarded  Oral Contrast: NONE  Complications: None reported at time of study completion    PROCEDURE:  MRI of the abdomen was performed.  MRCP was performed.    FINDINGS:  LOWER CHEST: Small hiatal hernia.    LIVER: Normal hepatic contour and parenchymal signal. 0.7 cm mildly T2   hyperintense lesion within hepatic segment 8 (series 4, image 11) with   imaging characteristics consistent with hemangioma. 0.4 cm cyst within   hepatic segment 4b (series 4, image 16).  BILE DUCTS: Normal caliber.  GALLBLADDER: Cholecystectomy.  SPLEEN: Within normal limits.  PANCREAS: 1.1 cm cystic lesion within the uncinate process (series 10,   image 19).  Additional 0.6 cm cystic lesion within the uncinate process   (series 10, image 15), likely representing the lesion found on 5/11/2023.   These lesions appear to connect with the pancreatic ductal system. No   appreciable enhancement. No main pancreatic ductal dilation.  ADRENALS: Within normal limits.  KIDNEYS/URETERS: No hydronephrosis. Right renal cyst.    VISUALIZED PORTIONS:  BOWEL: Colonic diverticulosis. Small hiatal hernia.  PERITONEUM: No ascites.  VESSELS: Within normal limits.  RETROPERITONEUM/LYMPH NODES: No lymphadenopathy.  ABDOMINAL WALL: Within normal limits.  BONES: Degenerative changes.    IMPRESSION:    No MRI evidence of acute abdominal pathology.    0.7 cm hemangioma within hepatic segment 8.    1.1 cm and 0.6 cm cystic lesions within the uncinate process of the   pancreas with connection to the pancreatic ductal system. No main   pancreatic ductal dilation. 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 Follow-up contrast enhanced abdominal MRI/MRCP is recommended in   24 months.        --- End of Report ---  JAMA SHANIQUE MD; Resident Radiologist  This document has been electronically signed.  VELVET RAMIREZ MD; Attending Radiologist  This document has been electronically signed. Oct 16 2024  9:53AM (10-15-24 @ 17:22)

## 2024-10-16 NOTE — PROGRESS NOTE ADULT - ASSESSMENT
75 y/o M with a pmhx of HLD, s/p cholecystectomy with Dr. Baxter in February 2024,  who presents complaining of persistent left sided abdominal pain radiating to the epigastrium region for the past 1 week.      # Diffuse abdominal pain with nausea, vomiting, possibly gallstone pancreatitis  # Elevated Lipase and LFTs  # Hx of cholestectomy in February 2024 with Dr. Baxter   - MRCP; No acute pathology, Side branch IPMN  - Will need outpatient EUS  - Will advance diet as tolerated    #Intermittent Hematochezia  - No anemia  - Likely hemorrhoidal  - Blood on wiping about 1-2 times a month mainly while straining  - Colonoscopy in 2024; few polyps with repeat in 3 years    #HLD  Hold statin.       VTE ppx: Heparin  GI ppx: not indicated.   Diet: NPO  Ambulate as tolerated.

## 2024-10-16 NOTE — PROGRESS NOTE ADULT - ASSESSMENT
74 year old male patient known to have a history of DL and cholelithiasis s/p CCY who presented to the ED on 10/14 for evaluation of post prandial abdominal pain associated with nausea and vomiting, found to have elevated lipase and liver enzymes on blood work and unremarkable imaging. We are consulted for concern of gallstone pancreatitis.      Elevated Lipase with Direct Hyperbilirubinemia and Transaminitis  Suspected Gallstone Pancreatitis s/p  CCY in 01/2024  0.7 cm hemangioma within hepatic segment 8.  * Status post robotic assisted lap CCY on 01/29/2024  * Social history: drinks 1 glass of wine weekly; last one was 10 days ago  * Presented on 10/14 for 1 week of intermittent LUQ and LLQ pains radiating to epigastric area and not back; associated with nausea and 4-5 episodes overnight from SUN to MON; associated with chills but no fever or weight loss or diarrhea; baseline BM: daily with intermittent blood on wiping  * Currently Hemodynamically stable; had sinus tachycardia on admission  * Labs: WBC 13.71, Hb 14.9, Plt 130, Na 135, K 4.8, BUN 16, Cr 1.4 on 10/14  * LFTs 2.4/247/114/300 on 10/14 -> 1.5/184/53/178 on 10/15; D-bili 1.2 on 10/14  * Serum lipase 1164 on 10/14 and 1221 on 10/15  * LA 1.7 on 10/14  *  on 10/14-> 133 on 10/15  * RUQ US 10/14 normal liver; CBD 7mm; CCY  * CT Abdomen and Pelvis w/ IV Cont (10.14.24 @ 14:30) Subcentimeter left hepatic lobe hypodensity is too small to characterize. The liver is otherwise grossly within normal limits.  * No previous EGD  * Had colonoscopy few months ago with Dr Brody: few polyps per patient; plan was to repeat in 3 years  * No Family history of GI cancers  * MRI with MRCP: 1.1 cm and 0.6 cm cystic lesions within the uncinate process of the pancreas with connection to the pancreatic ductal system. No main pancreatic ductal dilation.     RECS  -   - Trend LFTs closely, improving now, with no evidence of cholangitis   - Supportive measures: advance diet as tolerated (target Low fat, dairy free regular diet); lifestyle modifications (alcohol abstinence)  - Recommend aggressive IV fluid hydration  and monitor volume status closely. Trend LA  - Trend Hb, Ca, and other electrolytes  - Monitor urine output daily - Target Urine output: 0.5cc/kg/hr  - Monitor BUN and HCT BID - Target BUN <20 and HCT <44 and adjust fluids accordingly  - Monitor pain: management per team  - Monitor for nausea: recommend antiemetics PRN if QTc is within normal  - Recommend starting PO Protonix 40mg QD for GI Prophylaxis  - Counseled about importance of alcohol cessation  - Avoid NSAIDs      Intermittent Hematochezia- Likely Hemorrhoidal  No Anemia  * Hb 14.9 on 10/14  * No hx of anemia or iron studies  * Reported blood on wiping once monthly for years mainly when straining  * No previous EGD  * Had colonoscopy few months ago with Dr Brody: few polyps per patient; plan was to repeat in 3 years  * No Family history of GI cancers    RECOMMENDATIONS  - Trend CBC closely and keep active T/S  - Monitor BM. Recommend bowel regimen in case of constipation. Had BM on 10/14  - Outpatient follow up at Dr Brody's office to repeat colonoscopy when indicated 74 year old male patient known to have a history of DL and cholelithiasis s/p CCY who presented to the ED on 10/14 for evaluation of post prandial abdominal pain associated with nausea and vomiting, found to have elevated lipase and liver enzymes on blood work and unremarkable imaging. We are consulted for concern of gallstone pancreatitis.      Elevated Lipase with Direct Hyperbilirubinemia and Transaminitis Suspected Gallstone Pancreatitis s/p  CCY in 01/2024  0.7 cm hemangioma within hepatic segment 8.  Pancreatic cystic lesions  * Status post robotic assisted lap CCY on 01/29/2024  * Social history: drinks 1 glass of wine weekly; last one was 10 days ago  * Presented on 10/14 for 1 week of intermittent LUQ and LLQ pains radiating to epigastric area and not back; associated with nausea and 4-5 episodes overnight from SUN to MON; associated with chills but no fever or weight loss or diarrhea; baseline BM: daily with intermittent blood on wiping  * Currently Hemodynamically stable; had sinus tachycardia on admission  * Labs: WBC 13.71, Hb 14.9, Plt 130, Na 135, K 4.8, BUN 16, Cr 1.4 on 10/14  * LFTs 2.4/247/114/300 on 10/14 -> 1.5/184/53/178 on 10/15; D-bili 1.2 on 10/14  * Serum lipase 1164 on 10/14 and 1221 on 10/15  * LA 1.7 on 10/14  *  on 10/14-> 133 on 10/15  * RUQ US 10/14 normal liver; CBD 7mm; CCY  * CT Abdomen and Pelvis w/ IV Cont (10.14.24 @ 14:30) Subcentimeter left hepatic lobe hypodensity is too small to characterize. The liver is otherwise grossly within normal limits.  * No previous EGD  * Had colonoscopy few months ago with Dr Brody: few polyps per patient; plan was to repeat in 3 years  * No Family history of GI cancers  * MRI with MRCP: 1.1 cm and 0.6 cm cystic lesions within the uncinate process of the pancreas with connection to the pancreatic ductal system. No main pancreatic ductal dilation.     RECS  - Recommend Outpatient EUS   - Trend LFTs closely, improving now, with no evidence of cholangitis   - Supportive measures: advance diet as tolerated (target Low fat, dairy free regular diet); lifestyle modifications (alcohol abstinence)  - Monitor urine output daily - Target Urine output: 0.5cc/kg/hr  - Monitor BUN and HCT BID - Target BUN <20 and HCT <44 and adjust fluids accordingly  - Monitor pain: management per team  - Monitor for nausea: recommend antiemetics PRN if QTc is within normal  - Recommend starting PO Protonix 40mg QD for GI Prophylaxis  - Counseled about importance of alcohol cessation  - Avoid NSAIDs  - Follow up with our [facility] located at [address] [place] [address]. Phone Number: [phone]      Intermittent Hematochezia- Likely Hemorrhoidal  No Anemia  * Hb 14.9 on 10/14  * No hx of anemia or iron studies  * Reported blood on wiping once monthly for years mainly when straining  * No previous EGD  * Had colonoscopy few months ago with Dr Brody: few polyps per patient; plan was to repeat in 3 years  * No Family history of GI cancers    RECOMMENDATIONS  - Trend CBC closely and keep active T/S  - Monitor BM. Recommend bowel regimen in case of constipation. Had BM on 10/14  - Outpatient follow up at Dr Brody's office to repeat colonoscopy when indicated

## 2024-10-16 NOTE — PROGRESS NOTE ADULT - SUBJECTIVE AND OBJECTIVE BOX
T H I S   I S    N O  T   A    F I N A L I Z E D   N O T MARTINEZ POLANCO  74y, Male  Allergy: No Known Allergies    Hospital Day: 2d    Patient seen and examined earlier today.     PMH/PSH:  PAST MEDICAL & SURGICAL HISTORY:  Hiatal hernia      Duodenitis      HLD (hyperlipidemia)      History of cholecystectomy          LAST 24-Hr EVENTS:    VITALS:  T(F): 97.1 (10-16-24 @ 07:30), Max: 98.7 (10-16-24 @ 02:28)  HR: 81 (10-16-24 @ 07:30)  BP: 134/76 (10-16-24 @ 07:30) (123/75 - 134/76)  RR: 18 (10-16-24 @ 02:28)  SpO2: 97% (10-16-24 @ 02:28)          TESTS & MEASUREMENTS:  Weight/BMI  89.8 (10-14-24 @ 12:00)                          13.7   6.74  )-----------( 145      ( 16 Oct 2024 11:30 )             40.0     PT/INR - ( 16 Oct 2024 11:30 )   PT: 12.10 sec;   INR: 1.06 ratio         PTT - ( 16 Oct 2024 11:30 )  PTT:30.8 sec  INR: 1.06 ratio (10-16-24 @ 11:30)  INR: 1.25 ratio (10-15-24 @ 05:42)    10-15    140  |  102  |  16  ----------------------------<  88  4.2   |  27  |  1.1    Ca    8.6      15 Oct 2024 05:42  Phos  3.0     10-15  Mg     2.2     10-15    TPro  5.8[L]  /  Alb  3.4[L]  /  TBili  1.5[H]  /  DBili  x   /  AST  53[H]  /  ALT  178[H]  /  AlkPhos  184[H]  10-15    LIVER FUNCTIONS - ( 15 Oct 2024 05:42 )  Alb: 3.4 g/dL / Pro: 5.8 g/dL / ALK PHOS: 184 U/L / ALT: 178 U/L / AST: 53 U/L / GGT: x                 Urinalysis Basic - ( 15 Oct 2024 05:42 )    Color: x / Appearance: x / SG: x / pH: x  Gluc: 88 mg/dL / Ketone: x  / Bili: x / Urobili: x   Blood: x / Protein: x / Nitrite: x   Leuk Esterase: x / RBC: x / WBC x   Sq Epi: x / Non Sq Epi: x / Bacteria: x                            RADIOLOGY, ECG, & ADDITIONAL TESTS:  12 Lead ECG:   Ventricular Rate 87 BPM    Atrial Rate 87 BPM    P-R Interval 176 ms    QRS Duration 74 ms    Q-T Interval 350 ms    QTC Calculation(Bazett) 421 ms    P Axis 46 degrees    R Axis 32 degrees    T Axis 51 degrees    Diagnosis Line Normal sinus rhythm  Normal ECG    Confirmed by Hever Rodriguez ([id number]) on 10/14/2024 3:25:00 PM (10-14-24 @ 12:57)      RECENT DIAGNOSTIC ORDERS:  Antibody Screen Interpretation: 11:30 (10-16-24 @ 12:10)  Indirect Reacting Bilirubin: 11:30 (10-16-24 @ 12:10)  Bilirubin Direct: 11:30 (10-16-24 @ 12:10)  Type + Screen: Routine (10-16-24 @ 09:34)  Diet, Clear Liquid (10-16-24 @ 09:31)  Type + Screen: AM  Sched. Collection:19-Oct-2024 04:30 (10-16-24 @ 07:31)  Type + Screen: Repeat From: 16-Oct-2024 07:29 To: 22-Oct-2024 04:30, Every 3 day(s) (10-16-24 @ 07:31)  Magnesium: 11:00 (10-16-24 @ 07:31)  Comprehensive Metabolic Panel: 11:00 (10-16-24 @ 07:31)      MEDICATIONS:  MEDICATIONS  (STANDING):  chlorhexidine 2% Cloths 1 Application(s) Topical <User Schedule>  fluticasone propionate 50 MICROgram(s)/spray Nasal Spray 1 Spray(s) Both Nostrils two times a day  influenza  Vaccine (HIGH DOSE) 0.5 milliLiter(s) IntraMuscular once  lactated ringers. 1000 milliLiter(s) (250 mL/Hr) IV Continuous <Continuous>  ondansetron Injectable 4 milliGRAM(s) IV Push once  pantoprazole    Tablet 40 milliGRAM(s) Oral before breakfast    MEDICATIONS  (PRN):  acetaminophen     Tablet .. 650 milliGRAM(s) Oral every 6 hours PRN Temp greater or equal to 38C (100.4F), Mild Pain (1 - 3)  melatonin 3 milliGRAM(s) Oral at bedtime PRN Insomnia  temazepam 15 milliGRAM(s) Oral at bedtime PRN Insomnia      HOME MEDICATIONS:  rosuvastatin 10 mg oral capsule (01-18)      PHYSICAL EXAM:  GENERAL:   CHEST/LUNG:   HEART:   ABDOMEN:   EXTREMITIES:               RINKUMARTINEZ  74y, Male  Allergy: No Known Allergies    Hospital Day: 2d    Patient seen and examined earlier today. he is feeling better     PMH/PSH:  PAST MEDICAL & SURGICAL HISTORY:  Hiatal hernia      Duodenitis      HLD (hyperlipidemia)      History of cholecystectomy          LAST 24-Hr EVENTS:    VITALS:  T(F): 97.1 (10-16-24 @ 07:30), Max: 98.7 (10-16-24 @ 02:28)  HR: 81 (10-16-24 @ 07:30)  BP: 134/76 (10-16-24 @ 07:30) (123/75 - 134/76)  RR: 18 (10-16-24 @ 02:28)  SpO2: 97% (10-16-24 @ 02:28)          TESTS & MEASUREMENTS:  Weight/BMI  89.8 (10-14-24 @ 12:00)                          13.7   6.74  )-----------( 145      ( 16 Oct 2024 11:30 )             40.0     PT/INR - ( 16 Oct 2024 11:30 )   PT: 12.10 sec;   INR: 1.06 ratio         PTT - ( 16 Oct 2024 11:30 )  PTT:30.8 sec  INR: 1.06 ratio (10-16-24 @ 11:30)  INR: 1.25 ratio (10-15-24 @ 05:42)    10-15    140  |  102  |  16  ----------------------------<  88  4.2   |  27  |  1.1    Ca    8.6      15 Oct 2024 05:42  Phos  3.0     10-15  Mg     2.2     10-15    TPro  5.8[L]  /  Alb  3.4[L]  /  TBili  1.5[H]  /  DBili  x   /  AST  53[H]  /  ALT  178[H]  /  AlkPhos  184[H]  10-15    LIVER FUNCTIONS - ( 15 Oct 2024 05:42 )  Alb: 3.4 g/dL / Pro: 5.8 g/dL / ALK PHOS: 184 U/L / ALT: 178 U/L / AST: 53 U/L / GGT: x                 Urinalysis Basic - ( 15 Oct 2024 05:42 )    Color: x / Appearance: x / SG: x / pH: x  Gluc: 88 mg/dL / Ketone: x  / Bili: x / Urobili: x   Blood: x / Protein: x / Nitrite: x   Leuk Esterase: x / RBC: x / WBC x   Sq Epi: x / Non Sq Epi: x / Bacteria: x                            RADIOLOGY, ECG, & ADDITIONAL TESTS:  12 Lead ECG:   Ventricular Rate 87 BPM    Atrial Rate 87 BPM    P-R Interval 176 ms    QRS Duration 74 ms    Q-T Interval 350 ms    QTC Calculation(Bazett) 421 ms    P Axis 46 degrees    R Axis 32 degrees    T Axis 51 degrees    Diagnosis Line Normal sinus rhythm  Normal ECG    Confirmed by Hever Rodriguez ([id number]) on 10/14/2024 3:25:00 PM (10-14-24 @ 12:57)      RECENT DIAGNOSTIC ORDERS:  Antibody Screen Interpretation: 11:30 (10-16-24 @ 12:10)  Indirect Reacting Bilirubin: 11:30 (10-16-24 @ 12:10)  Bilirubin Direct: 11:30 (10-16-24 @ 12:10)  Type + Screen: Routine (10-16-24 @ 09:34)  Diet, Clear Liquid (10-16-24 @ 09:31)  Type + Screen: AM  Sched. Collection:19-Oct-2024 04:30 (10-16-24 @ 07:31)  Type + Screen: Repeat From: 16-Oct-2024 07:29 To: 22-Oct-2024 04:30, Every 3 day(s) (10-16-24 @ 07:31)  Magnesium: 11:00 (10-16-24 @ 07:31)  Comprehensive Metabolic Panel: 11:00 (10-16-24 @ 07:31)      MEDICATIONS:  MEDICATIONS  (STANDING):  chlorhexidine 2% Cloths 1 Application(s) Topical <User Schedule>  fluticasone propionate 50 MICROgram(s)/spray Nasal Spray 1 Spray(s) Both Nostrils two times a day  influenza  Vaccine (HIGH DOSE) 0.5 milliLiter(s) IntraMuscular once  lactated ringers. 1000 milliLiter(s) (250 mL/Hr) IV Continuous <Continuous>  ondansetron Injectable 4 milliGRAM(s) IV Push once  pantoprazole    Tablet 40 milliGRAM(s) Oral before breakfast    MEDICATIONS  (PRN):  acetaminophen     Tablet .. 650 milliGRAM(s) Oral every 6 hours PRN Temp greater or equal to 38C (100.4F), Mild Pain (1 - 3)  melatonin 3 milliGRAM(s) Oral at bedtime PRN Insomnia  temazepam 15 milliGRAM(s) Oral at bedtime PRN Insomnia      HOME MEDICATIONS:  rosuvastatin 10 mg oral capsule (01-18)      PHYSICAL EXAM:  On exam  General: awake, alert, NAD,   Lungs:  clear to ausculations b/l, normal resp effort  Heart: regular ryhthm   Abdomen: soft, non tender non distended + BS   Ext: no edema, can move all  his extremities

## 2024-10-16 NOTE — PROGRESS NOTE ADULT - SUBJECTIVE AND OBJECTIVE BOX
SUBJECTIVE/OVERNIGHT EVENTS  Today is hospital day 2d. This morning patient was seen and examined at bedside, resting comfortably in bed. No acute or major events overnight.    MEDICATIONS  STANDING MEDICATIONS  chlorhexidine 2% Cloths 1 Application(s) Topical <User Schedule>  enoxaparin Injectable 40 milliGRAM(s) SubCutaneous every 24 hours  fluticasone propionate 50 MICROgram(s)/spray Nasal Spray 1 Spray(s) Both Nostrils two times a day  influenza  Vaccine (HIGH DOSE) 0.5 milliLiter(s) IntraMuscular once  lactated ringers. 1000 milliLiter(s) IV Continuous <Continuous>  ondansetron Injectable 4 milliGRAM(s) IV Push once  pantoprazole    Tablet 40 milliGRAM(s) Oral before breakfast    PRN MEDICATIONS  acetaminophen     Tablet .. 650 milliGRAM(s) Oral every 6 hours PRN  melatonin 3 milliGRAM(s) Oral at bedtime PRN  temazepam 15 milliGRAM(s) Oral at bedtime PRN    VITALS  T(F): 98.1 (10-16-24 @ 16:00), Max: 98.7 (10-16-24 @ 02:28)  HR: 89 (10-16-24 @ 16:00) (71 - 89)  BP: 134/80 (10-16-24 @ 16:00) (123/75 - 134/80)  RR: 19 (10-16-24 @ 16:00) (18 - 19)  SpO2: 97% (10-16-24 @ 16:00) (97% - 97%)    PHYSICAL EXAM  GENERAL: NAD  HEART: RRR  LUNGS: Normal b/l air entry  ABDOMEN: Soft, NT/ND, +ve BS x 4  EXTREMITIES: No edema, 2+ PP  NERVOUS SYSTEM: AO3  SKIN: Warm and dry, intact    LABS             13.7   6.74  )-----------( 145      ( 10-16-24 @ 11:30 )             40.0     142  |  103  |  15  -------------------------<  114   10-16-24 @ 11:30  4.5  |  27  |  1.0    Ca      9.2     10-16-24 @ 11:30  Phos   3.0     10-15-24 @ 05:42  Mg     2.2     10-16-24 @ 11:30    TPro  6.3  /  Alb  3.9  /  TBili  1.0  /  DBili  0.5  /  AST  44  /  ALT  144  /  AlkPhos  189  /  GGT  x     10-16-24 @ 11:30    PT/INR - ( 10-16-24 @ 11:30 )   PT: 12.10 sec;   INR: 1.06 ratio  PTT - ( 10-16-24 @ 11:30 )  PTT:30.8 sec    Troponin T, High Sensitivity Result: 13 ng/L (10-14-24 @ 15:24)  Troponin T, High Sensitivity Result: 15 ng/L (10-14-24 @ 13:00)    Urinalysis Basic - ( 16 Oct 2024 11:30 )    Color: x / Appearance: x / SG: x / pH: x  Gluc: 114 mg/dL / Ketone: x  / Bili: x / Urobili: x   Blood: x / Protein: x / Nitrite: x   Leuk Esterase: x / RBC: x / WBC x   Sq Epi: x / Non Sq Epi: x / Bacteria: x          IMAGING

## 2024-10-16 NOTE — PROGRESS NOTE ADULT - ATTENDING COMMENTS
Agree with the above.  Patient being followed for gallstone pancreatitis, feeling better with no pain today.  Tolerating liquid diet.  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  No inpatient endoscopic intervention.  Trend LFTs.  Outpatient follow-up for EUS.  Rest of recommendations per the note above.    Time-based billing (NON-critical care).   50 minutes spent on total encounter; more than 50% of the visit was spent counseling and / or coordinating care by the attending physician.  The necessity of the time spent during the encounter on this date of service was due to: Coordination of care.

## 2024-10-16 NOTE — PROGRESS NOTE ADULT - ASSESSMENT
73 y/o M with a pmhx of HLD, s/p cholecystectomy with Dr. Baxter in February 2024,  who presents complaining of persistent left sided abdominal pain radiating to the epigastrium region for the past 1 week.    [place] for possible ERCP     []Diffuse abdominal pain with nausea, vomiting, possibly acute  gallstone pancreatitis  []0.7 cm hemangioma within hepatic segment 8.  [] Pancreatic cystic lesions  [] Elevated Lipase and LFTs  [] Hx of cholestectomy in February 2024 with Dr. Baxter   -Sepsis not present on admission  -Lipase 1164, LFTs elevated improving  - CT abdomen and RUQ sonogram: No acute abnormality in the abdomen and pelvis.  TG wnl   -Pain control   -Continue with  IV hydration decrease rate today and monitor   start CLD diet and upgrade as tolerated   -Trend LFTs/ INR   -CT abdomen and RUQ sonogram: No acute abnormality in the abdomen and pelvis.  Surgery consult appreciated - no intervention  MRCP reported:  No MRI evidence of acute abdominal pathology.   0.7 cm hemangioma within hepatic segment 8. 1.1 cm and 0.6 cm cystic lesions within the uncinate process of the   pancreas with connection to the pancreatic ductal system. No main pancreatic ductal dilation. 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 Follow-up contrast enhanced abdominal MRI/MRCP is recommended in 24 months.   -GI consulted and recommendations appreciated- Outpatient follow-up for EUS  monitor HCT and BUN   Monitor Hgb and platelet    Reported Intermittent Hematochezia-   As per GI - Likely Hemorrhoidal , Outpatient follow up at Dr Brody's office to repeat colonoscopy when indicated   Monitor CBC and bowel movement     #HLD  Hold statin.       VTE ppx: Heparin  GI ppx PPI     Pendign: upgrade diet, monitor symptoms and labs,   d/c plan

## 2024-10-17 ENCOUNTER — TRANSCRIPTION ENCOUNTER (OUTPATIENT)
Age: 74
End: 2024-10-17

## 2024-10-17 VITALS — HEART RATE: 78 BPM | SYSTOLIC BLOOD PRESSURE: 126 MMHG | TEMPERATURE: 98 F | DIASTOLIC BLOOD PRESSURE: 78 MMHG

## 2024-10-17 LAB
ALBUMIN SERPL ELPH-MCNC: 3.6 G/DL — SIGNIFICANT CHANGE UP (ref 3.5–5.2)
ALP SERPL-CCNC: 173 U/L — HIGH (ref 30–115)
ALT FLD-CCNC: 120 U/L — HIGH (ref 0–41)
ANION GAP SERPL CALC-SCNC: 9 MMOL/L — SIGNIFICANT CHANGE UP (ref 7–14)
AST SERPL-CCNC: 42 U/L — HIGH (ref 0–41)
BASOPHILS # BLD AUTO: 0.03 K/UL — SIGNIFICANT CHANGE UP (ref 0–0.2)
BASOPHILS NFR BLD AUTO: 0.4 % — SIGNIFICANT CHANGE UP (ref 0–1)
BILIRUB SERPL-MCNC: 0.8 MG/DL — SIGNIFICANT CHANGE UP (ref 0.2–1.2)
BUN SERPL-MCNC: 11 MG/DL — SIGNIFICANT CHANGE UP (ref 10–20)
CALCIUM SERPL-MCNC: 9 MG/DL — SIGNIFICANT CHANGE UP (ref 8.4–10.5)
CHLORIDE SERPL-SCNC: 104 MMOL/L — SIGNIFICANT CHANGE UP (ref 98–110)
CO2 SERPL-SCNC: 27 MMOL/L — SIGNIFICANT CHANGE UP (ref 17–32)
CREAT SERPL-MCNC: 1.1 MG/DL — SIGNIFICANT CHANGE UP (ref 0.7–1.5)
EGFR: 71 ML/MIN/1.73M2 — SIGNIFICANT CHANGE UP
EOSINOPHIL # BLD AUTO: 0.25 K/UL — SIGNIFICANT CHANGE UP (ref 0–0.7)
EOSINOPHIL NFR BLD AUTO: 3.7 % — SIGNIFICANT CHANGE UP (ref 0–8)
GLUCOSE SERPL-MCNC: 89 MG/DL — SIGNIFICANT CHANGE UP (ref 70–99)
HCT VFR BLD CALC: 39.2 % — LOW (ref 42–52)
HGB BLD-MCNC: 13.5 G/DL — LOW (ref 14–18)
IMM GRANULOCYTES NFR BLD AUTO: 0.4 % — HIGH (ref 0.1–0.3)
LYMPHOCYTES # BLD AUTO: 1.08 K/UL — LOW (ref 1.2–3.4)
LYMPHOCYTES # BLD AUTO: 15.9 % — LOW (ref 20.5–51.1)
MAGNESIUM SERPL-MCNC: 2.1 MG/DL — SIGNIFICANT CHANGE UP (ref 1.8–2.4)
MCHC RBC-ENTMCNC: 29.8 PG — SIGNIFICANT CHANGE UP (ref 27–31)
MCHC RBC-ENTMCNC: 34.4 G/DL — SIGNIFICANT CHANGE UP (ref 32–37)
MCV RBC AUTO: 86.5 FL — SIGNIFICANT CHANGE UP (ref 80–94)
MONOCYTES # BLD AUTO: 0.65 K/UL — HIGH (ref 0.1–0.6)
MONOCYTES NFR BLD AUTO: 9.5 % — HIGH (ref 1.7–9.3)
NEUTROPHILS # BLD AUTO: 4.77 K/UL — SIGNIFICANT CHANGE UP (ref 1.4–6.5)
NEUTROPHILS NFR BLD AUTO: 70.1 % — SIGNIFICANT CHANGE UP (ref 42.2–75.2)
NRBC # BLD: 0 /100 WBCS — SIGNIFICANT CHANGE UP (ref 0–0)
PLATELET # BLD AUTO: 172 K/UL — SIGNIFICANT CHANGE UP (ref 130–400)
PMV BLD: 11.4 FL — HIGH (ref 7.4–10.4)
POTASSIUM SERPL-MCNC: 4.4 MMOL/L — SIGNIFICANT CHANGE UP (ref 3.5–5)
POTASSIUM SERPL-SCNC: 4.4 MMOL/L — SIGNIFICANT CHANGE UP (ref 3.5–5)
PROT SERPL-MCNC: 5.9 G/DL — LOW (ref 6–8)
RBC # BLD: 4.53 M/UL — LOW (ref 4.7–6.1)
RBC # FLD: 12.6 % — SIGNIFICANT CHANGE UP (ref 11.5–14.5)
SODIUM SERPL-SCNC: 140 MMOL/L — SIGNIFICANT CHANGE UP (ref 135–146)
WBC # BLD: 6.81 K/UL — SIGNIFICANT CHANGE UP (ref 4.8–10.8)
WBC # FLD AUTO: 6.81 K/UL — SIGNIFICANT CHANGE UP (ref 4.8–10.8)

## 2024-10-17 PROCEDURE — 99232 SBSQ HOSP IP/OBS MODERATE 35: CPT

## 2024-10-17 PROCEDURE — 99239 HOSP IP/OBS DSCHRG MGMT >30: CPT

## 2024-10-17 RX ORDER — PANTOPRAZOLE SODIUM 40 MG/1
1 TABLET, DELAYED RELEASE ORAL
Qty: 0 | Refills: 0 | DISCHARGE
Start: 2024-10-17

## 2024-10-17 RX ORDER — PANTOPRAZOLE SODIUM 40 MG/1
1 TABLET, DELAYED RELEASE ORAL
Qty: 30 | Refills: 3
Start: 2024-10-17 | End: 2025-02-13

## 2024-10-17 RX ORDER — 5-HYDROXYTRYPTOPHAN (5-HTP) 100 MG
2 TABLET,DISINTEGRATING ORAL
Qty: 60 | Refills: 1
Start: 2024-10-17 | End: 2024-12-15

## 2024-10-17 RX ADMIN — CHLORHEXIDINE GLUCONATE ORAL RINSE 1 APPLICATION(S): 1.2 SOLUTION DENTAL at 05:08

## 2024-10-17 RX ADMIN — FLUTICASONE PROPIONATE 1 SPRAY(S): 50 SPRAY, METERED NASAL at 05:08

## 2024-10-17 RX ADMIN — PANTOPRAZOLE SODIUM 40 MILLIGRAM(S): 40 TABLET, DELAYED RELEASE ORAL at 05:08

## 2024-10-17 NOTE — DISCHARGE NOTE NURSING/CASE MANAGEMENT/SOCIAL WORK - FINANCIAL ASSISTANCE
[facility] provides services at a reduced cost to those who are determined to be eligible through [facility]’s financial assistance program. Information regarding [facility]’s financial assistance program can be found by going to [email or web address] or by calling [phone].

## 2024-10-17 NOTE — PROGRESS NOTE ADULT - SUBJECTIVE AND OBJECTIVE BOX
Gastroenterology progress note:     Patient is a 74y old  Male who presents with a chief complaint of      Admitted on: 10-14-24    We are following the patient for: gallstone pancreatitis       Interval History: No acute events overnight.   - Diet - tolerating diet  - last BM - yesterday  - Abdominal pain - none      PAST MEDICAL & SURGICAL HISTORY:  Hiatal hernia      Duodenitis      HLD (hyperlipidemia)      History of cholecystectomy          MEDICATIONS  (STANDING):  chlorhexidine 2% Cloths 1 Application(s) Topical <User Schedule>  enoxaparin Injectable 40 milliGRAM(s) SubCutaneous every 24 hours  fluticasone propionate 50 MICROgram(s)/spray Nasal Spray 1 Spray(s) Both Nostrils two times a day  influenza  Vaccine (HIGH DOSE) 0.5 milliLiter(s) IntraMuscular once  lactated ringers. 1000 milliLiter(s) (100 mL/Hr) IV Continuous <Continuous>  ondansetron Injectable 4 milliGRAM(s) IV Push once  pantoprazole    Tablet 40 milliGRAM(s) Oral before breakfast    MEDICATIONS  (PRN):  acetaminophen     Tablet .. 650 milliGRAM(s) Oral every 6 hours PRN Temp greater or equal to 38C (100.4F), Mild Pain (1 - 3)  melatonin 3 milliGRAM(s) Oral at bedtime PRN Insomnia  temazepam 15 milliGRAM(s) Oral at bedtime PRN Insomnia      Allergies  No Known Allergies      Review of Systems:   Cardiovascular:  No Chest Pain, No Palpitations  Respiratory:  No Cough, No Dyspnea  Gastrointestinal:  As described in HPI  Skin:  No Skin Lesions, No Jaundice  Neuro:  No Syncope, No Dizziness    Physical Examination:  T(C): 36.6 (10-17-24 @ 07:30), Max: 36.8 (10-16-24 @ 23:24)  HR: 78 (10-17-24 @ 07:30) (78 - 89)  BP: 126/78 (10-17-24 @ 07:30) (126/78 - 137/74)  RR: 19 (10-16-24 @ 23:24) (19 - 19)  SpO2: 96% (10-16-24 @ 23:24) (96% - 97%)        GENERAL: AAOx3, no acute distress.  HEAD:  Atraumatic, Normocephalic  EYES: conjunctiva and sclera clear  NECK: Supple, no JVD or thyromegaly  CHEST/LUNG: Clear to auscultation bilaterally; No wheeze, rhonchi, or rales  HEART: Regular rate and rhythm; normal S1, S2, No murmurs.  ABDOMEN: Soft, nontender, nondistended; Bowel sounds present  NEUROLOGY: No asterixis or tremor.   SKIN: Intact, no jaundice     Data:                        13.5   6.81  )-----------( 172      ( 17 Oct 2024 06:19 )             39.2     Hgb trend:  13.5  10-17-24 @ 06:19  13.7  10-16-24 @ 11:30  12.8  10-15-24 @ 05:42  14.9  10-14-24 @ 12:30      10-17    140  |  104  |  11  ----------------------------<  89  4.4   |  27  |  1.1    Ca    9.0      17 Oct 2024 06:19  Mg     2.1     10-17    TPro  5.9[L]  /  Alb  3.6  /  TBili  0.8  /  DBili  x   /  AST  42[H]  /  ALT  120[H]  /  AlkPhos  173[H]  10-17    Liver panel trend:  TBili 0.8   /   AST 42   /      /   AlkP 173   /   Tptn 5.9   /   Alb 3.6    /   DBili --      10-17  TBili 1.0   /   AST 44   /      /   AlkP 189   /   Tptn 6.3   /   Alb 3.9    /   DBili 0.5      10-16  TBili 1.5   /   AST 53   /      /   AlkP 184   /   Tptn 5.8   /   Alb 3.4    /   DBili --      10-15  TBili 2.4   /      /      /   AlkP 247   /   Tptn 6.9   /   Alb 4.2    /   DBili 1.2      10-14      PT/INR - ( 16 Oct 2024 11:30 )   PT: 12.10 sec;   INR: 1.06 ratio         PTT - ( 16 Oct 2024 11:30 )  PTT:30.8 sec       Radiology:      MR MRCP w/wo IV Cont:   ACC: [id number] EXAM:  MR MRCP WAW IC   ORDERED BY: ARTIE BRANDT     PROCEDURE DATE:  10/15/2024          INTERPRETATION:  CLINICAL INFORMATION: Transaminitis, abdominal pain    COMPARISON: CT abdomen and pelvis 10/14/2024, MRI abdomen 5/11/2023.    CONTRAST/COMPLICATIONS:  IV Contrast: Gadavist  9 cc administered   1 cc discarded  Oral Contrast: NONE  Complications: None reported at time of study completion    PROCEDURE:  MRI of the abdomen was performed.  MRCP was performed.    FINDINGS:  LOWER CHEST: Small hiatal hernia.    LIVER: Normal hepatic contour and parenchymal signal. 0.7 cm mildly T2   hyperintense lesion within hepatic segment 8 (series 4, image 11) with   imaging characteristics consistent with hemangioma. 0.4 cm cyst within   hepatic segment 4b (series 4, image 16).  BILE DUCTS: Normal caliber.  GALLBLADDER: Cholecystectomy.  SPLEEN: Within normal limits.  PANCREAS: 1.1 cm cystic lesion within the uncinate process (series 10,   image 19).  Additional 0.6 cm cystic lesion within the uncinate process   (series 10, image 15), likely representing the lesion found on 5/11/2023.   These lesions appear to connect with the pancreatic ductal system. No   appreciable enhancement. No main pancreatic ductal dilation.  ADRENALS: Within normal limits.  KIDNEYS/URETERS: No hydronephrosis. Right renal cyst.    VISUALIZED PORTIONS:  BOWEL: Colonic diverticulosis. Small hiatal hernia.  PERITONEUM: No ascites.  VESSELS: Within normal limits.  RETROPERITONEUM/LYMPH NODES: No lymphadenopathy.  ABDOMINAL WALL: Within normal limits.  BONES: Degenerative changes.    IMPRESSION:    No MRI evidence of acute abdominal pathology.    0.7 cm hemangioma within hepatic segment 8.    1.1 cm and 0.6 cm cystic lesions within the uncinate process of the   pancreas with connection to the pancreatic ductal system. No main   pancreatic ductal dilation. 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 Follow-up contrast enhanced abdominal MRI/MRCP is recommended in   24 months.        --- End of Report ---          JAMA BAY MD; Resident Radiologist  This document has been electronically signed.  VELVET RAMIREZ MD; Attending Radiologist  This document has been electronically signed. Oct 16 2024  9:53AM (10-15-24 @ 17:22)

## 2024-10-17 NOTE — DISCHARGE NOTE NURSING/CASE MANAGEMENT/SOCIAL WORK - NSDCPEFALRISK_GEN_ALL_CORE
For information on Fall & Injury Prevention, visit: [email or web address] OR  [email or web address] OR  https://www.cdc.gov/steadi/patient.html

## 2024-10-17 NOTE — DISCHARGE NOTE PROVIDER - NSDCCPCAREPLAN_GEN_ALL_CORE_FT
PRINCIPAL DISCHARGE DIAGNOSIS  Diagnosis: Acute pancreatitis  Assessment and Plan of Treatment: You were admitted for acute pancreatitis, this was treated using pain caontrol and IV fluids. You are required to follow up with your primary care physician and the gastroenterologists for outpatient testing. [facility] located at [address] [place] [address]. Phone Number: [phone]         PRINCIPAL DISCHARGE DIAGNOSIS  Diagnosis: Acute pancreatitis  Assessment and Plan of Treatment: You were admitted for acute pancreatitis, this was treated using pain caontrol and IV fluids. You are required to follow up with your primary care physician and the gastroenterologists for outpatient testing. [facility] located at [address] [place] [address]. Phone Number: [phone].  Please avoid any alocohol use , avoid nonsteroidal antiinflamatorry medications such as iboprofen.  Please call 911 and come to the ED if any developing or recur of symptoms including but not limited to fever, abd pain, naussea, vomiting, confusion, jaundice, change in urine or stool color         PRINCIPAL DISCHARGE DIAGNOSIS  Diagnosis: Acute pancreatitis  Assessment and Plan of Treatment: You were admitted for acute pancreatitis, this was treated using pain caontrol and IV fluids. You are required to follow up with your primary care physician and the gastroenterologists for outpatient testing. [facility] located at [address] [place] [address]. Phone Number: [phone].  Please avoid any alocohol use , avoid nonsteroidal antiinflamatorry medications such as iboprofen.  Please call 911 and come to the ED if any developing or recur of symptoms including but not limited to fever, abd pain, naussea, vomiting, confusion, jaundice, change in urine or stool color  .   MRCP reported:  No MRI evidence of acute abdominal pathology.   0.7 cm hemangioma within hepatic segment 8. 1.1 cm and 0.6 cm cystic lesions within the uncinate process of the   pancreas with connection to the pancreatic ductal system. No main pancreatic ductal dilation. 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 Follow-up contrast enhanced abdominal MRI/MRCP is recommended in 24 months.   follow up with Gastroenterology for that please

## 2024-10-17 NOTE — PROGRESS NOTE ADULT - SUBJECTIVE AND OBJECTIVE BOX
T H I S   I S    N O  T   A    F I N A L I Z E D   N O T MARTINEZ POLANCO  74y, Male  Allergy: No Known Allergies    Hospital Day: 3d    Patient seen and examined earlier today.     PMH/PSH:  PAST MEDICAL & SURGICAL HISTORY:  Hiatal hernia      Duodenitis      HLD (hyperlipidemia)      History of cholecystectomy          LAST 24-Hr EVENTS:    VITALS:  T(F): 97.9 (10-17-24 @ 07:30), Max: 98.3 (10-16-24 @ 23:24)  HR: 78 (10-17-24 @ 07:30)  BP: 126/78 (10-17-24 @ 07:30) (126/78 - 137/74)  RR: 19 (10-16-24 @ 23:24)  SpO2: 96% (10-16-24 @ 23:24)          TESTS & MEASUREMENTS:  Weight/BMI  89.8 (10-14-24 @ 12:00)                          13.5   6.81  )-----------( 172      ( 17 Oct 2024 06:19 )             39.2     PT/INR - ( 16 Oct 2024 11:30 )   PT: 12.10 sec;   INR: 1.06 ratio         PTT - ( 16 Oct 2024 11:30 )  PTT:30.8 sec  INR: 1.06 ratio (10-16-24 @ 11:30)  INR: 1.25 ratio (10-15-24 @ 05:42)    10-17    140  |  104  |  11  ----------------------------<  89  4.4   |  27  |  1.1    Ca    9.0      17 Oct 2024 06:19  Mg     2.1     10-17    TPro  5.9[L]  /  Alb  3.6  /  TBili  0.8  /  DBili  x   /  AST  42[H]  /  ALT  120[H]  /  AlkPhos  173[H]  10-17    LIVER FUNCTIONS - ( 17 Oct 2024 06:19 )  Alb: 3.6 g/dL / Pro: 5.9 g/dL / ALK PHOS: 173 U/L / ALT: 120 U/L / AST: 42 U/L / GGT: x                 Urinalysis Basic - ( 17 Oct 2024 06:19 )    Color: x / Appearance: x / SG: x / pH: x  Gluc: 89 mg/dL / Ketone: x  / Bili: x / Urobili: x   Blood: x / Protein: x / Nitrite: x   Leuk Esterase: x / RBC: x / WBC x   Sq Epi: x / Non Sq Epi: x / Bacteria: x                            RADIOLOGY, ECG, & ADDITIONAL TESTS:  12 Lead ECG:   Ventricular Rate 87 BPM    Atrial Rate 87 BPM    P-R Interval 176 ms    QRS Duration 74 ms    Q-T Interval 350 ms    QTC Calculation(Bazett) 421 ms    P Axis 46 degrees    R Axis 32 degrees    T Axis 51 degrees    Diagnosis Line Normal sinus rhythm  Normal ECG    Confirmed by Hever Rodriguez ([id number]) on 10/14/2024 3:25:00 PM (10-14-24 @ 12:57)      RECENT DIAGNOSTIC ORDERS:  Diet, Regular:   Low Fat (LOWFAT) (10-17-24 @ 07:54)      MEDICATIONS:  MEDICATIONS  (STANDING):  chlorhexidine 2% Cloths 1 Application(s) Topical <User Schedule>  enoxaparin Injectable 40 milliGRAM(s) SubCutaneous every 24 hours  fluticasone propionate 50 MICROgram(s)/spray Nasal Spray 1 Spray(s) Both Nostrils two times a day  influenza  Vaccine (HIGH DOSE) 0.5 milliLiter(s) IntraMuscular once  lactated ringers. 1000 milliLiter(s) (100 mL/Hr) IV Continuous <Continuous>  ondansetron Injectable 4 milliGRAM(s) IV Push once  pantoprazole    Tablet 40 milliGRAM(s) Oral before breakfast    MEDICATIONS  (PRN):  acetaminophen     Tablet .. 650 milliGRAM(s) Oral every 6 hours PRN Temp greater or equal to 38C (100.4F), Mild Pain (1 - 3)  melatonin 3 milliGRAM(s) Oral at bedtime PRN Insomnia  temazepam 15 milliGRAM(s) Oral at bedtime PRN Insomnia      HOME MEDICATIONS:  rosuvastatin 10 mg oral capsule (01-18)      PHYSICAL EXAM:  GENERAL:   CHEST/LUNG:   HEART:   ABDOMEN:   EXTREMITIES:

## 2024-10-17 NOTE — DISCHARGE NOTE PROVIDER - CARE PROVIDER_API CALL
Antonella Gomez  Gastroenterology  [address] [place] [address]  Phone: [phone]  Fax: [phone]  Follow Up Time: 2 weeks

## 2024-10-17 NOTE — DISCHARGE NOTE PROVIDER - NSDCMRMEDTOKEN_GEN_ALL_CORE_FT
pantoprazole 40 mg oral delayed release tablet: 1 tab(s) orally once a day (before a meal)  rosuvastatin 10 mg oral capsule: 1 cap(s) orally   Melatonin 3 mg oral tablet: 2 tab(s) orally once a day (at bedtime) as needed for Insomnia  pantoprazole 40 mg oral delayed release tablet: 1 tab(s) orally once a day (before a meal)  rosuvastatin 10 mg oral capsule: 1 cap(s) orally

## 2024-10-17 NOTE — DISCHARGE NOTE PROVIDER - HOSPITAL COURSE
HPI:  Patient is a 75 y/o M with a pmhx of HLD, s/p cholecystectomy with Dr. Baxter in February 2024,  who presents complaining of persistent left sided abdominal pain radiating to the epigastrium region for the past 1 week. Describes the pain as "stabbing" rating it a 10/10 in intensity. Associated with multiple episodes of NBNB vomiting, nausea and chills. (-) fever. States the pain started suddenly several hours after eating soup. Last bowel movement this morning. States he's been doing well after the cholecystomy. States the pain is similar to the gall stones. Otherwise patient denies fever, chest pain, diarrhea, constipation,  (14 Oct 2024 19:17)    Hospital Course:    The patient remained hemodynamically stable throughout his admission, the pain resolved with IV fluids and pain control. Diet was advanced and patient tolerated solid food and passed solid bowel movements. Patient is ready for discharge. Will get an outpatient EUS.    A/P:  #Diffuse abdominal pain with nausea, vomiting, possibly gallstone pancreatitis  #Elevated Lipase and LFTs  #Hx of cholestectomy in February 2024 with Dr. Baxter   - MRCP; No acute pathology, Side branch IPMN  - Will need outpatient EUS  - Diet advanced as tolerated    #Intermittent Hematochezia  - No anemia  - Likely hemorrhoidal  - Blood on wiping about 1-2 times a month mainly while straining  - Colonoscopy in 2024; few polyps with repeat in 3 years    #HLD  Hold statin.     Discussion of discharge plan of care, including discharge diagnoses, medication reconciliation, and follow-ups was conducted with Dr. Ramirez on 10/17/2024, and discharge was approved.   HPI:  Patient is a 73 y/o M with a pmhx of HLD, s/p cholecystectomy with Dr. Baxter in February 2024,  who presents complaining of persistent left sided abdominal pain radiating to the epigastrium region for the past 1 week. Describes the pain as "stabbing" rating it a 10/10 in intensity. Associated with multiple episodes of NBNB vomiting, nausea and chills. (-) fever. States the pain started suddenly several hours after eating soup. Last bowel movement this morning. States he's been doing well after the cholecystomy. States the pain is similar to the gall stones. Otherwise patient denies fever, chest pain, diarrhea, constipation,  (14 Oct 2024 19:17)    Hospital Course:    The patient remained hemodynamically stable throughout his admission, the pain resolved with IV fluids and pain control. Diet was advanced and patient tolerated solid food and passed solid bowel movements. Patient is ready for discharge. Will get an outpatient EUS.    A/P:  #Diffuse abdominal pain with nausea, vomiting, possibly gallstone pancreatitis  #Elevated Lipase and LFTs  #Hx of cholestectomy in February 2024 with Dr. Baxter   - MRCP; No acute pathology, Side branch IPMN  - Will need outpatient EUS  - Diet advanced as tolerated    #Intermittent Hematochezia  - No anemia  - Likely hemorrhoidal  - Blood on wiping about 1-2 times a month mainly while straining  - Colonoscopy in 2024; few polyps with repeat in 3 years    #HLD  Hold statin.     Discussion of discharge plan of care, including discharge diagnoses, medication reconciliation, and follow-ups was conducted with Dr. Ramirez on 10/17/2024, and discharge was approved.      Attending addendum:   The patient stated that he feels fine, no abd pain, moved his bowel, tolerated regular diet  LFTs improved    cleared by GI for d/c - outpatient EUS   AND follow up at Dr Brody's office to repeat colonoscopy when indicated  discussed with the patient discharge plan in details, he asked to get something for insomnia, i discussed with him the risk of benzo as [place] [place] [facility] [place]   and I offered melatonin in stead he agreed , he will follow with his PCP   send melatonin and PPI

## 2024-10-17 NOTE — DISCHARGE NOTE NURSING/CASE MANAGEMENT/SOCIAL WORK - PATIENT PORTAL LINK FT
You can access the FollowMyHealth Patient Portal offered by [facility] by registering at the following website: [email or web address]. By joining [employer]’s FollowMyHealth portal, you will also be able to view your health information using other applications (apps) compatible with our system.

## 2024-10-17 NOTE — PROGRESS NOTE ADULT - ASSESSMENT
74 year old male patient known to have a history of DL and cholelithiasis s/p CCY who presented to the ED on 10/14 for evaluation of post prandial abdominal pain associated with nausea and vomiting, found to have elevated lipase and liver enzymes on blood work and unremarkable imaging. We are consulted for concern of gallstone pancreatitis.      Elevated Lipase with Direct Hyperbilirubinemia and Transaminitis Suspected Gallstone Pancreatitis s/p  CCY in 01/2024- improved  0.7 cm hemangioma within hepatic segment 8.  Pancreatic cystic lesions likely side branch IPMN  * Status post robotic assisted lap CCY on 01/29/2024  * Social history: drinks 1 glass of wine weekly; last one was 10 days ago  * Presented on 10/14 for 1 week of intermittent LUQ and LLQ pains radiating to epigastric area and not back; associated with nausea and 4-5 episodes overnight from SUN to MON; associated with chills but no fever or weight loss or diarrhea; baseline BM: daily with intermittent blood on wiping  * Currently Hemodynamically stable; had sinus tachycardia on admission  * Serum lipase 1164 on 10/14 and 1221 on 10/15  * LA 1.7 on 10/14  *  on 10/14-> 133 on 10/15  * RUQ US 10/14 normal liver; CBD 7mm; CCY  * CT Abdomen and Pelvis w/ IV Cont (10.14.24 @ 14:30) Subcentimeter left hepatic lobe hypodensity is too small to characterize. The liver is otherwise grossly within normal limits.  * No previous EGD  * Had colonoscopy few months ago with Dr Brody: few polyps per patient; plan was to repeat in 3 years  * No Family history of GI cancers  * MRI with MRCP: 1.1 cm and 0.6 cm cystic lesions within the uncinate process of the pancreas with connection to the pancreatic ductal system. No main pancreatic ductal dilation.   - 10/17: tolerating solid diet    RECS  - Recommend Outpatient EUS   - Trend LFTs closely, improving now, with no evidence of cholangitis   - Supportive measures: advance diet as tolerated (target Low fat, dairy free regular diet); lifestyle modifications (alcohol abstinence)  - Monitor urine output daily - Target Urine output: 0.5cc/kg/hr  - Monitor BUN and HCT BID - Target BUN <20 and HCT <44 and adjust fluids accordingly  - Monitor pain: management per team  - Monitor for nausea: recommend antiemetics PRN if QTc is within normal  - Recommend starting PO Protonix 40mg QD for GI Prophylaxis  - Counseled about importance of alcohol cessation  - Avoid NSAIDs  - Follow up with our [facility] located at [address] [place] [address]. Phone Number: [phone]      Intermittent Hematochezia- Likely Hemorrhoidal  No Anemia  * Hb 14.9 on 10/14  * No hx of anemia or iron studies  * Reported blood on wiping once monthly for years mainly when straining  * No previous EGD  * Had colonoscopy few months ago with Dr Brody: few polyps per patient; plan was to repeat in 3 years  * No Family history of GI cancers    RECOMMENDATIONS  - Trend CBC closely and keep active T/S  - Monitor BM. Recommend bowel regimen in case of constipation. Had BM on 10/14  - Outpatient follow up at Dr Brody's office to repeat colonoscopy when indicated

## 2024-10-17 NOTE — DISCHARGE NOTE PROVIDER - ATTENDING DISCHARGE PHYSICAL EXAMINATION:
On exam  General: awake, alert, NAD,   Lungs:  clear to ausculations b/l, normal resp effort  Heart: regular ryhthm   Abdomen: soft, non tender non distended + BS   Ext: no edema, can move all  his extremities

## 2024-10-24 DIAGNOSIS — K92.1 MELENA: ICD-10-CM

## 2024-10-24 DIAGNOSIS — D18.03 HEMANGIOMA OF INTRA-ABDOMINAL STRUCTURES: ICD-10-CM

## 2024-10-24 DIAGNOSIS — I10 ESSENTIAL (PRIMARY) HYPERTENSION: ICD-10-CM

## 2024-10-24 DIAGNOSIS — E78.5 HYPERLIPIDEMIA, UNSPECIFIED: ICD-10-CM

## 2024-10-24 DIAGNOSIS — Z90.49 ACQUIRED ABSENCE OF OTHER SPECIFIED PARTS OF DIGESTIVE TRACT: ICD-10-CM

## 2024-10-24 DIAGNOSIS — K85.10 BILIARY ACUTE PANCREATITIS WITHOUT NECROSIS OR INFECTION: ICD-10-CM

## 2024-10-24 DIAGNOSIS — D13.6 BENIGN NEOPLASM OF PANCREAS: ICD-10-CM

## 2024-12-17 ENCOUNTER — APPOINTMENT (OUTPATIENT)
Dept: GASTROENTEROLOGY | Facility: CLINIC | Age: 74
End: 2024-12-17
Payer: MEDICARE

## 2024-12-17 ENCOUNTER — NON-APPOINTMENT (OUTPATIENT)
Age: 74
End: 2024-12-17

## 2024-12-17 VITALS — BODY MASS INDEX: 29.15 KG/M2 | WEIGHT: 203.6 LBS | HEIGHT: 70 IN

## 2024-12-17 VITALS
SYSTOLIC BLOOD PRESSURE: 153 MMHG | OXYGEN SATURATION: 98 % | WEIGHT: 203.6 LBS | HEIGHT: 70 IN | DIASTOLIC BLOOD PRESSURE: 86 MMHG | BODY MASS INDEX: 29.15 KG/M2 | HEART RATE: 87 BPM

## 2024-12-17 DIAGNOSIS — K86.2 CYST OF PANCREAS: ICD-10-CM

## 2024-12-17 DIAGNOSIS — Z87.19 PERSONAL HISTORY OF OTHER DISEASES OF THE DIGESTIVE SYSTEM: ICD-10-CM

## 2024-12-17 DIAGNOSIS — K21.9 GASTRO-ESOPHAGEAL REFLUX DISEASE W/OUT ESOPHAGITIS: ICD-10-CM

## 2024-12-17 DIAGNOSIS — Z78.9 OTHER SPECIFIED HEALTH STATUS: ICD-10-CM

## 2024-12-17 DIAGNOSIS — R79.89 OTHER SPECIFIED ABNORMAL FINDINGS OF BLOOD CHEMISTRY: ICD-10-CM

## 2024-12-17 DIAGNOSIS — K57.90 DIVERTICULOSIS OF INTESTINE, PART UNSPECIFIED, W/OUT PERFORATION OR ABSCESS W/OUT BLEEDING: ICD-10-CM

## 2024-12-17 DIAGNOSIS — Z87.09 PERSONAL HISTORY OF OTHER DISEASES OF THE RESPIRATORY SYSTEM: ICD-10-CM

## 2024-12-17 DIAGNOSIS — Z63.4 DISAPPEARANCE AND DEATH OF FAMILY MEMBER: ICD-10-CM

## 2024-12-17 DIAGNOSIS — Z86.39 PERSONAL HISTORY OF OTHER ENDOCRINE, NUTRITIONAL AND METABOLIC DISEASE: ICD-10-CM

## 2024-12-17 DIAGNOSIS — K85.90 ACUTE PANCREATITIS WITHOUT NECROSIS OR INFECTION, UNSPECIFIED: ICD-10-CM

## 2024-12-17 DIAGNOSIS — K80.20 CALCULUS OF GALLBLADDER W/OUT CHOLECYSTITIS W/OUT OBSTRUCTION: ICD-10-CM

## 2024-12-17 PROCEDURE — 99214 OFFICE O/P EST MOD 30 MIN: CPT

## 2024-12-17 RX ORDER — PANTOPRAZOLE 40 MG/1
40 TABLET, DELAYED RELEASE ORAL
Qty: 1 | Refills: 2 | Status: ACTIVE | COMMUNITY
Start: 2024-12-17 | End: 1900-01-01

## 2024-12-17 RX ORDER — PANTOPRAZOLE SODIUM 40 MG/1
40 GRANULE, DELAYED RELEASE ORAL
Refills: 0 | Status: ACTIVE | COMMUNITY

## 2024-12-17 SDOH — SOCIAL STABILITY - SOCIAL INSECURITY: DISSAPEARANCE AND DEATH OF FAMILY MEMBER: Z63.4

## 2025-01-13 ENCOUNTER — TRANSCRIPTION ENCOUNTER (OUTPATIENT)
Age: 75
End: 2025-01-13

## 2025-01-13 ENCOUNTER — RESULT REVIEW (OUTPATIENT)
Age: 75
End: 2025-01-13

## 2025-01-13 ENCOUNTER — OUTPATIENT (OUTPATIENT)
Dept: OUTPATIENT SERVICES | Facility: HOSPITAL | Age: 75
LOS: 1 days | Discharge: HOME | End: 2025-01-13
Payer: MEDICARE

## 2025-01-13 VITALS
TEMPERATURE: 98 F | SYSTOLIC BLOOD PRESSURE: 154 MMHG | HEIGHT: 70 IN | DIASTOLIC BLOOD PRESSURE: 90 MMHG | OXYGEN SATURATION: 98 % | HEART RATE: 91 BPM | WEIGHT: 199.96 LBS | RESPIRATION RATE: 18 BRPM

## 2025-01-13 VITALS
OXYGEN SATURATION: 97 % | SYSTOLIC BLOOD PRESSURE: 125 MMHG | HEART RATE: 85 BPM | RESPIRATION RATE: 18 BRPM | DIASTOLIC BLOOD PRESSURE: 59 MMHG

## 2025-01-13 DIAGNOSIS — R53.83 OTHER FATIGUE: ICD-10-CM

## 2025-01-13 DIAGNOSIS — K21.9 GASTRO-ESOPHAGEAL REFLUX DISEASE WITHOUT ESOPHAGITIS: ICD-10-CM

## 2025-01-13 PROCEDURE — 88305 TISSUE EXAM BY PATHOLOGIST: CPT

## 2025-01-13 PROCEDURE — 88312 SPECIAL STAINS GROUP 1: CPT

## 2025-01-13 PROCEDURE — 88312 SPECIAL STAINS GROUP 1: CPT | Mod: 26

## 2025-01-13 PROCEDURE — 43237 ENDOSCOPIC US EXAM ESOPH: CPT | Mod: XU

## 2025-01-13 PROCEDURE — 43239 EGD BIOPSY SINGLE/MULTIPLE: CPT

## 2025-01-13 PROCEDURE — 88305 TISSUE EXAM BY PATHOLOGIST: CPT | Mod: 26

## 2025-01-13 RX ORDER — SODIUM CHLORIDE 9 MG/ML
500 INJECTION, SOLUTION INTRAVENOUS
Refills: 0 | Status: DISCONTINUED | OUTPATIENT
Start: 2025-01-13 | End: 2025-01-13

## 2025-01-13 NOTE — ASU PATIENT PROFILE, ADULT - FALL HARM RISK - UNIVERSAL INTERVENTIONS
Bed in lowest position, wheels locked, appropriate side rails in place/Call bell, personal items and telephone in reach/Instruct patient to call for assistance before getting out of bed or chair/Non-slip footwear when patient is out of bed/[place] to call system/Physically safe environment - no spills, clutter or unnecessary equipment/Purposeful Proactive Rounding/Room/bathroom lighting operational, light cord in reach

## 2025-01-13 NOTE — ASU DISCHARGE PLAN (ADULT/PEDIATRIC) - FINANCIAL ASSISTANCE
[facility] provides services at a reduced cost to those who are determined to be eligible through [facility]’s financial assistance program. Information regarding [facility]’s financial assistance program can be found by going to [email or web address] or by calling [phone].

## 2025-01-13 NOTE — ASU DISCHARGE PLAN (ADULT/PEDIATRIC) - CARE PROVIDER_API CALL
Antonella Gomez  Gastroenterology  [address] [facility] [place] [address]  Phone: [phone]  Fax: [phone]  Follow Up Time: 1 month

## 2025-01-13 NOTE — ASU PREOP CHECKLIST - AS BP NONINV SITE
Message  Received: Yesterday  Kisha Mckeon PharmD Sayegh, Kamil Nadim, MD  Sure thing.     1) Amlodipine - monitor for hypotension and reduce dose if needed   2) Tolterodine - there is a drug interaction, but ok with her current dose   3) Warfarin - would recommend re-checking INR within 3 days     Everything else is ok!   Kisha Mckeon PharmD, [facility]   Medication Therapy Management Provider   Pager: [phone]             Previous Messages       ----- Message -----   From: Jerad Ley RN   Sent: 9/9/2022  12:00 PM CDT   To: Jeremiah Tripp, I believe this message was for you?   Jerad Ley RN   [facility]         ----- Message -----   From: Catracho Kenny MD   Sent: 9/9/2022   9:49 AM CDT   To:  Triage      Cristofer Beard,   Please can review this patient's medication and interaction with Paxlovid.     Thank you   Catracho     
left upper arm

## 2025-01-13 NOTE — ASU DISCHARGE PLAN (ADULT/PEDIATRIC) - NS MD DC FALL RISK RISK
For information on Fall & Injury Prevention, visit: [email or web address] OR  [email or web address] OR  https://www.cdc.gov/steadi/patient.html

## 2025-01-16 DIAGNOSIS — E78.5 HYPERLIPIDEMIA, UNSPECIFIED: ICD-10-CM

## 2025-01-16 DIAGNOSIS — K21.00 GASTRO-ESOPHAGEAL REFLUX DISEASE WITH ESOPHAGITIS, WITHOUT BLEEDING: ICD-10-CM

## 2025-01-16 DIAGNOSIS — K57.10 DIVERTICULOSIS OF SMALL INTESTINE WITHOUT PERFORATION OR ABSCESS WITHOUT BLEEDING: ICD-10-CM

## 2025-01-16 DIAGNOSIS — K44.9 DIAPHRAGMATIC HERNIA WITHOUT OBSTRUCTION OR GANGRENE: ICD-10-CM

## 2025-01-16 DIAGNOSIS — K29.50 UNSPECIFIED CHRONIC GASTRITIS WITHOUT BLEEDING: ICD-10-CM

## 2025-01-16 DIAGNOSIS — K21.9 GASTRO-ESOPHAGEAL REFLUX DISEASE WITHOUT ESOPHAGITIS: ICD-10-CM

## 2025-01-16 DIAGNOSIS — K86.2 CYST OF PANCREAS: ICD-10-CM

## 2025-01-28 ENCOUNTER — APPOINTMENT (OUTPATIENT)
Dept: GASTROENTEROLOGY | Facility: CLINIC | Age: 75
End: 2025-01-28

## 2025-05-05 ENCOUNTER — APPOINTMENT (OUTPATIENT)
Dept: ORTHOPEDIC SURGERY | Facility: CLINIC | Age: 75
End: 2025-05-05
Payer: MEDICARE

## 2025-05-05 DIAGNOSIS — M65.341 TRIGGER FINGER, RIGHT RING FINGER: ICD-10-CM

## 2025-05-05 DIAGNOSIS — M65.331 TRIGGER FINGER, RIGHT MIDDLE FINGER: ICD-10-CM

## 2025-05-05 PROCEDURE — 99202 OFFICE O/P NEW SF 15 MIN: CPT

## 2025-05-13 ENCOUNTER — APPOINTMENT (OUTPATIENT)
Dept: OTOLARYNGOLOGY | Facility: CLINIC | Age: 75
End: 2025-05-13
Payer: MEDICARE

## 2025-05-13 ENCOUNTER — NON-APPOINTMENT (OUTPATIENT)
Age: 75
End: 2025-05-13

## 2025-05-13 VITALS — BODY MASS INDEX: 29.33 KG/M2 | WEIGHT: 198 LBS | HEIGHT: 69 IN

## 2025-05-13 DIAGNOSIS — H93.12 TINNITUS, LEFT EAR: ICD-10-CM

## 2025-05-13 DIAGNOSIS — H61.23 IMPACTED CERUMEN, BILATERAL: ICD-10-CM

## 2025-05-13 DIAGNOSIS — H90.3 SENSORINEURAL HEARING LOSS, BILATERAL: ICD-10-CM

## 2025-05-13 PROCEDURE — 99203 OFFICE O/P NEW LOW 30 MIN: CPT | Mod: 25

## 2025-05-13 PROCEDURE — G0268 REMOVAL OF IMPACTED WAX MD: CPT

## 2025-05-13 PROCEDURE — 92567 TYMPANOMETRY: CPT

## 2025-05-13 PROCEDURE — 92557 COMPREHENSIVE HEARING TEST: CPT

## 2025-06-19 ENCOUNTER — RX RENEWAL (OUTPATIENT)
Age: 75
End: 2025-06-19

## 2025-07-21 NOTE — ED PROVIDER NOTE - PROGRESS NOTE DETAILS
In an effort to ensure that our patients LiveWell, a Team Member has reviewed your chart and identified an opportunity to provide the best care possible. An attempt was made to discuss or schedule due or overdue Preventive or Chronic Condition care.Care Gaps identified: Cervical Cancer Screening and Wellness Visits.    The Outcome was Contact was not made, left message. We are attempting to schedule a yearly wellness visit. If you have any questions or need help with scheduling, contact your primary care provider..   Type of Appointment needed: cpe and pap     teams message placed for gi, hospitalist made aware

## 2025-09-03 ENCOUNTER — APPOINTMENT (OUTPATIENT)
Dept: CARDIOLOGY | Facility: CLINIC | Age: 75
End: 2025-09-03
Payer: MEDICARE

## 2025-09-03 ENCOUNTER — NON-APPOINTMENT (OUTPATIENT)
Age: 75
End: 2025-09-03

## 2025-09-03 VITALS
HEIGHT: 69 IN | SYSTOLIC BLOOD PRESSURE: 130 MMHG | DIASTOLIC BLOOD PRESSURE: 88 MMHG | HEART RATE: 101 BPM | BODY MASS INDEX: 28.88 KG/M2 | WEIGHT: 195 LBS

## 2025-09-03 DIAGNOSIS — I10 ESSENTIAL (PRIMARY) HYPERTENSION: ICD-10-CM

## 2025-09-03 DIAGNOSIS — Z83.3 FAMILY HISTORY OF DIABETES MELLITUS: ICD-10-CM

## 2025-09-03 DIAGNOSIS — E78.5 HYPERLIPIDEMIA, UNSPECIFIED: ICD-10-CM

## 2025-09-03 DIAGNOSIS — Z78.9 OTHER SPECIFIED HEALTH STATUS: ICD-10-CM

## 2025-09-03 DIAGNOSIS — R06.09 OTHER FORMS OF DYSPNEA: ICD-10-CM

## 2025-09-03 DIAGNOSIS — Z82.49 FAMILY HISTORY OF ISCHEMIC HEART DISEASE AND OTHER DISEASES OF THE CIRCULATORY SYSTEM: ICD-10-CM

## 2025-09-03 DIAGNOSIS — R53.83 OTHER FATIGUE: ICD-10-CM

## 2025-09-03 PROCEDURE — 99214 OFFICE O/P EST MOD 30 MIN: CPT

## 2025-09-03 PROCEDURE — 93000 ELECTROCARDIOGRAM COMPLETE: CPT

## 2025-09-03 PROCEDURE — G2211 COMPLEX E/M VISIT ADD ON: CPT
